# Patient Record
Sex: FEMALE | Race: WHITE | NOT HISPANIC OR LATINO | Employment: FULL TIME | URBAN - NONMETROPOLITAN AREA
[De-identification: names, ages, dates, MRNs, and addresses within clinical notes are randomized per-mention and may not be internally consistent; named-entity substitution may affect disease eponyms.]

---

## 2018-12-07 ENCOUNTER — HISTORICAL (OUTPATIENT)
Dept: ADMINISTRATIVE | Facility: HOSPITAL | Age: 33
End: 2018-12-07

## 2018-12-19 ENCOUNTER — HISTORICAL (OUTPATIENT)
Dept: ADMINISTRATIVE | Facility: HOSPITAL | Age: 33
End: 2018-12-19

## 2020-09-23 LAB
BILIRUB SERPL-MCNC: NEGATIVE MG/DL
BLOOD URINE, POC: NORMAL
CLARITY, POC UA: NORMAL
COLOR, POC UA: YELLOW
GLUCOSE UR QL STRIP: NEGATIVE
KETONES UR QL STRIP: NEGATIVE
LEUKOCYTE EST, POC UA: NEGATIVE
NITRITE, POC UA: NEGATIVE
PH, POC UA: 5.5
POC BETA-HCG (QUAL): NEGATIVE
PROTEIN, POC: NEGATIVE
SPECIFIC GRAVITY, POC UA: NORMAL
UROBILINOGEN, POC UA: NORMAL

## 2020-11-05 LAB — POC BETA-HCG (QUAL): NEGATIVE

## 2020-11-11 LAB — POC BETA-HCG (QUAL): NEGATIVE

## 2022-04-11 ENCOUNTER — HISTORICAL (OUTPATIENT)
Dept: ADMINISTRATIVE | Facility: HOSPITAL | Age: 37
End: 2022-04-11

## 2022-04-28 VITALS
BODY MASS INDEX: 38.02 KG/M2 | WEIGHT: 222.69 LBS | HEIGHT: 64 IN | SYSTOLIC BLOOD PRESSURE: 126 MMHG | DIASTOLIC BLOOD PRESSURE: 76 MMHG

## 2022-09-21 ENCOUNTER — HISTORICAL (OUTPATIENT)
Dept: ADMINISTRATIVE | Facility: HOSPITAL | Age: 37
End: 2022-09-21

## 2023-03-29 ENCOUNTER — OFFICE VISIT (OUTPATIENT)
Dept: OBSTETRICS AND GYNECOLOGY | Facility: CLINIC | Age: 38
End: 2023-03-29
Payer: COMMERCIAL

## 2023-03-29 ENCOUNTER — LAB VISIT (OUTPATIENT)
Dept: LAB | Facility: HOSPITAL | Age: 38
End: 2023-03-29
Attending: OBSTETRICS & GYNECOLOGY
Payer: COMMERCIAL

## 2023-03-29 VITALS
HEIGHT: 64 IN | SYSTOLIC BLOOD PRESSURE: 128 MMHG | WEIGHT: 205.5 LBS | BODY MASS INDEX: 35.08 KG/M2 | TEMPERATURE: 98 F | DIASTOLIC BLOOD PRESSURE: 68 MMHG

## 2023-03-29 DIAGNOSIS — L68.0 HIRSUTISM: ICD-10-CM

## 2023-03-29 DIAGNOSIS — Z01.419 WELL WOMAN EXAM: ICD-10-CM

## 2023-03-29 DIAGNOSIS — Z12.4 PAP SMEAR FOR CERVICAL CANCER SCREENING: Primary | ICD-10-CM

## 2023-03-29 DIAGNOSIS — R30.0 DYSURIA: ICD-10-CM

## 2023-03-29 LAB
BILIRUB UR QL STRIP: NEGATIVE
DEPRECATED CALCIDIOL+CALCIFEROL SERPL-MC: 20.8 NG/ML (ref 30–100)
ERYTHROCYTE [DISTWIDTH] IN BLOOD BY AUTOMATED COUNT: 12 % (ref 11–14.5)
GLUCOSE UR QL STRIP: NEGATIVE
HCT VFR BLD AUTO: 41 % (ref 36–48)
HGB BLD-MCNC: 13.8 G/DL (ref 11.8–16)
KETONES UR QL STRIP: NEGATIVE
LEUKOCYTE ESTERASE UR QL STRIP: NEGATIVE
MCH RBC QN AUTO: 28.2 PG (ref 27–34)
MCV RBC AUTO: 83.8 FL (ref 79–99)
MEAN CELL HEMOGLOBIN CONCENTRATION (OHS) G/DL: 33.7 G/DL (ref 31–37)
NRBC BLD AUTO-RTO: 0 % (ref 0–1)
PH, POC UA: 7
PLATELET # BLD AUTO: 321 X10(3)/MCL (ref 140–371)
PMV BLD AUTO: 9.9 FL (ref 9.4–12.4)
POC BLOOD, URINE: POSITIVE
POC NITRATES, URINE: NEGATIVE
PROT UR QL STRIP: NEGATIVE
RBC # BLD AUTO: 4.89 X10(6)/MCL (ref 4–5.1)
SP GR UR STRIP: 1.02 (ref 1–1.03)
TSH SERPL-ACNC: 0.84 UIU/ML (ref 0.36–3.74)
UROBILINOGEN UR STRIP-ACNC: 0.2 (ref 0.1–1.1)
VIT B12 SERPL-MCNC: 541 PG/ML (ref 211–946)
WBC # SPEC AUTO: 7.8 X10(3)/MCL (ref 4–11.5)

## 2023-03-29 PROCEDURE — 84443 ASSAY THYROID STIM HORMONE: CPT

## 2023-03-29 PROCEDURE — 3008F PR BODY MASS INDEX (BMI) DOCUMENTED: ICD-10-PCS | Mod: CPTII,,, | Performed by: OBSTETRICS & GYNECOLOGY

## 2023-03-29 PROCEDURE — 81003 URINALYSIS AUTO W/O SCOPE: CPT | Mod: QW,,, | Performed by: OBSTETRICS & GYNECOLOGY

## 2023-03-29 PROCEDURE — 99395 PREV VISIT EST AGE 18-39: CPT | Mod: 25,,, | Performed by: OBSTETRICS & GYNECOLOGY

## 2023-03-29 PROCEDURE — 85027 COMPLETE CBC AUTOMATED: CPT

## 2023-03-29 PROCEDURE — 1160F RVW MEDS BY RX/DR IN RCRD: CPT | Mod: CPTII,,, | Performed by: OBSTETRICS & GYNECOLOGY

## 2023-03-29 PROCEDURE — 3074F PR MOST RECENT SYSTOLIC BLOOD PRESSURE < 130 MM HG: ICD-10-PCS | Mod: CPTII,,, | Performed by: OBSTETRICS & GYNECOLOGY

## 2023-03-29 PROCEDURE — 82306 VITAMIN D 25 HYDROXY: CPT

## 2023-03-29 PROCEDURE — 82607 VITAMIN B-12: CPT

## 2023-03-29 PROCEDURE — 3008F BODY MASS INDEX DOCD: CPT | Mod: CPTII,,, | Performed by: OBSTETRICS & GYNECOLOGY

## 2023-03-29 PROCEDURE — 3078F DIAST BP <80 MM HG: CPT | Mod: CPTII,,, | Performed by: OBSTETRICS & GYNECOLOGY

## 2023-03-29 PROCEDURE — 99395 PR PREVENTIVE VISIT,EST,18-39: ICD-10-PCS | Mod: 25,,, | Performed by: OBSTETRICS & GYNECOLOGY

## 2023-03-29 PROCEDURE — 3074F SYST BP LT 130 MM HG: CPT | Mod: CPTII,,, | Performed by: OBSTETRICS & GYNECOLOGY

## 2023-03-29 PROCEDURE — 84402 ASSAY OF FREE TESTOSTERONE: CPT | Mod: 90

## 2023-03-29 PROCEDURE — 1159F MED LIST DOCD IN RCRD: CPT | Mod: CPTII,,, | Performed by: OBSTETRICS & GYNECOLOGY

## 2023-03-29 PROCEDURE — 1159F PR MEDICATION LIST DOCUMENTED IN MEDICAL RECORD: ICD-10-PCS | Mod: CPTII,,, | Performed by: OBSTETRICS & GYNECOLOGY

## 2023-03-29 PROCEDURE — 81003 POCT URINALYSIS, DIPSTICK, AUTOMATED, W/O SCOPE: ICD-10-PCS | Mod: QW,,, | Performed by: OBSTETRICS & GYNECOLOGY

## 2023-03-29 PROCEDURE — 36415 COLL VENOUS BLD VENIPUNCTURE: CPT

## 2023-03-29 PROCEDURE — 3078F PR MOST RECENT DIASTOLIC BLOOD PRESSURE < 80 MM HG: ICD-10-PCS | Mod: CPTII,,, | Performed by: OBSTETRICS & GYNECOLOGY

## 2023-03-29 PROCEDURE — 1160F PR REVIEW ALL MEDS BY PRESCRIBER/CLIN PHARMACIST DOCUMENTED: ICD-10-PCS | Mod: CPTII,,, | Performed by: OBSTETRICS & GYNECOLOGY

## 2023-03-29 RX ORDER — LISDEXAMFETAMINE DIMESYLATE 60 MG/1
CAPSULE ORAL
COMMUNITY
Start: 2023-01-18 | End: 2023-10-11

## 2023-03-29 NOTE — PROGRESS NOTES
Chief Complaint  Well woman. Burning with urination.    History of Present Illness:  Rachael Corrales is a 37 y.o. year old  presents for her well woman. She is doing well. Denies any health changes. Currently using nothing for birth control. Patient has monthly cycles with moderate flow lasting 5 days. Denies any irregular menstrual bleeding. Denies significant dysmenorrhea.      Reports an increase in facial hair as well has fatigue.     C/o painful urination and pressure in the bladder.         LMP: 3/10/23  Frequency: 28 days   Cycle Length: 4-5  days   Flow: normal  Intermenstrual Bleeding: No  Postcoital Bleeding: No  Dysmenorrhea: No  Sexually Active: yes   Dyspareunia: No  Contraception: none   H/o STI: HPV   Last pap: 20 ASCUS +HPV  H/o Abnormal Pap: Yes   Gardasil: none   MMG: n/a    Review of Systems:  General/Constitutional: Chills denies. Fatigue/weakness ADMITS . Fever denies . Night sweats denies . Hot flashes denies    Respiratory: Cough denies . Hemoptysis denies . SOB denies . Sputum production denies . Wheezing denies .   Cardiovascular: Chest pain denies . Dizziness denies . Palpitations denies . Swelling in hands/feet denies    Gastrointestinal: Abdominal pain denies . Blood in stool denies . Constipation denies. Diarrhea denies . Heartburn denies . Nausea denies . Vomiting denies    Genitourinary: Incontinence denies . Blood in urine denies. Frequent urination ADMITS . Painful urination denies. Urinary urgency denies.  Nocturia denies    Gynecologic: Irregular menses denies. Heavy bleeding  denies. Painful menses denies. Vaginal discharge denies . Vaginal odor denies. Vaginal itching denies   Vaginal lesion denies.  Pelvic pain denies . Decreased libido denies. Vulvar lesion denies . Prolapse of genital organs denies . Painful intercourse denies . Postcoital bleeding denies    Psychiatric: Depression denies. Anxiety denies      OB History          3    Para   3    Term   3     "        AB        Living   3         SAB        IAB        Ectopic        Multiple        Live Births   3               Past Medical History:   Diagnosis Date    Mental disorder        Current Outpatient Medications:     lisdexamfetamine (VYVANSE) 60 MG capsule, , Disp: , Rfl:     Review of patient's allergies indicates:  No Known Allergies    History reviewed. No pertinent surgical history.    Family History   Problem Relation Age of Onset    Breast cancer Maternal Grandmother      Social History     Socioeconomic History    Marital status:    Tobacco Use    Smoking status: Never    Smokeless tobacco: Never   Substance and Sexual Activity    Alcohol use: Yes    Drug use: Never    Sexual activity: Yes     Partners: Male     Birth control/protection: None       Physical Exam:  /68 (BP Location: Left arm)   Temp 97.5 °F (36.4 °C)   Ht 5' 3.78" (1.62 m)   Wt 93.2 kg (205 lb 7.5 oz)   LMP 03/10/2023   BMI 35.51 kg/m²     Chaperone: present.     General appearance: healthy, well-nourished and well-developed     Psychiatric: Orientation to time, place and person. Normal mood and affect and active, alert     Skin: Appearance: no rashes or lesions.     Neck:   Neck: supple, FROM, trachea midline. and no masses   Thyroid: no enlargement or nodules and non-tender.       Cardiovascular:   Auscultation: RRR and no murmur.   Peripheral Vascular: no varicosities, LLE edema, RLE edema, calf tenderness, and palpable cord and pedal pulses intact.     Lungs:   Respiratory effort: no intercostal retractions or accessory muscle usage.   Auscultation: no wheezing, rales/crackles, or rhonchi and clear to auscultation.     Breast:   Inspection/Palpation: no tenderness, discrete/distinct masses, skin changes, or abnormal secretions. Nipple appearance normal.     Abdomen:   Auscultation/Inspection/Palpation: no hepatomegaly, splenomegaly, masses, tenderness or CVA tenderness and soft, non-distended bowel sounds " preset.    Hernia: no palpable hernias.     Female Genitalia:   Vulva: no masses, tenderness or lesions   Bladder/Urethra: no urethral discharge or mass, normal meatus, bladder non-distended.   Vagina: no tenderness, erythema, cystocele, rectocele, abnormal vaginal discharge or vesicle(s) or ulcers   Cervix: no discharge, no cervical lacerations noted or motion tenderness and grossly normal   Uterus: normal size and shape and midline, non-tender, and no uterine prolapse.   Adnexa/Parametria: no parametrial tenderness or mass, no adnexal tenderness or ovarian mass.     Lymph Nodes:   Palpation: non tender submandibular nodes, axillary nodes, or inguinal nodes.     Rectal Exam:   Rectum: normal perianal skin.       Assessment/Plan:  1. Well woman exam  Pap  Advised patient if she notices any changes to her breasts, including a lump, mass, dimpling, discharge, rash or tenderness, to should contact us immediately   Healthy diet, exercise   Multivitamin   Seat belt   Sunscreen use   Safe sex/ STI education   Contraception evaluation: NONE   Gardasil evaluation: x0    2. Dysuria  UA, normal  Hydration  Call if symptoms do not improve    -     POCT Urinalysis, Dipstick, Automated, W/O Scope    3. Hirsutism  Educated patient on hirsutism or as excessive male-pattern hair growth in a woman, is common affecting between 5 - 10 percent of women of reproductive age.       We discussed available pharmacologic options, methods of direct hair removal (photoepilation [laser and intense pulsed light] electrolysis, and waxing/shaving), and bleaching.       Educated patient changes may not be evident after starting pharmacologic therapy until six moths due to the growth phase of a hair follicle is approximately six months; a significant reduction in hair growth may not occur before then.       If suboptimal cosmetic result after six months of JOSÉ MIGUEL monotherapy, we may suggest adding an antiandrogen.       Educated drug therapy is  unlikely to eliminate already existing hair growth, but that hair may become less coarse, grow more slowly, and/or require less frequent use of cosmetic methods (shaving, plucking, waxing).     -     TESTOSTERONE, FREE (DIALYSIS) AND TOTAL, LC/MS/MS; Future; Expected date: 03/29/2023  -     CBC Without Differential; Future; Expected date: 03/29/2023  -     Vitamin D; Future; Expected date: 03/29/2023  -     Vitamin B12; Future; Expected date: 03/29/2023  -     TSH; Future; Expected date: 03/29/2023  -     T4, Free; Future; Expected date: 03/29/2023       4. Fatigue

## 2023-03-30 ENCOUNTER — TELEPHONE (OUTPATIENT)
Dept: OBSTETRICS AND GYNECOLOGY | Facility: CLINIC | Age: 38
End: 2023-03-30
Payer: COMMERCIAL

## 2023-03-30 DIAGNOSIS — R79.89 LOW VITAMIN D LEVEL: Primary | ICD-10-CM

## 2023-03-30 LAB — FINAL PATHOLOGIC DIAGNOSIS: NORMAL

## 2023-03-30 RX ORDER — VIT C/E/ZN/COPPR/LUTEIN/ZEAXAN 250MG-90MG
1000 CAPSULE ORAL DAILY
Qty: 30 CAPSULE | Refills: 3 | Status: SHIPPED | OUTPATIENT
Start: 2023-03-30 | End: 2023-10-11

## 2023-03-30 NOTE — TELEPHONE ENCOUNTER
Notified patient of low vitamin d. Medication sent to [facility]. Scheduled for 3 months. Patient voiced understanding.

## 2023-04-03 LAB
TESTOST FREE SERPL-MCNC: 0.73 NG/DL
TESTOST SERPL-MCNC: 35 NG/DL (ref 8–60)

## 2023-04-04 LAB
AGE GDLN ACOG TESTING: NORMAL
CYTOLOGIST CVX/VAG CYTO: NORMAL
CYTOLOGY CVX/VAG DOC CYTO: NORMAL
CYTOLOGY CVX/VAG DOC THIN PREP: NORMAL
DX ICD CODE: NORMAL
HPV I/H RISK 4 DNA CVX QL PROBE+SIG AMP: NEGATIVE
LC HPV GENOTYPE REFLEX: NORMAL
Lab: NORMAL
OTHER STN SPEC: NORMAL
STAT OF ADQ CVX/VAG CYTO-IMP: NORMAL

## 2023-07-07 ENCOUNTER — HOSPITAL ENCOUNTER (EMERGENCY)
Facility: HOSPITAL | Age: 38
Discharge: HOME | End: 2023-07-07
Attending: FAMILY MEDICINE
Payer: COMMERCIAL

## 2023-07-07 ENCOUNTER — TELEPHONE (OUTPATIENT)
Dept: OBSTETRICS AND GYNECOLOGY | Facility: CLINIC | Age: 38
End: 2023-07-07
Payer: COMMERCIAL

## 2023-07-07 VITALS
WEIGHT: 205 LBS | SYSTOLIC BLOOD PRESSURE: 124 MMHG | DIASTOLIC BLOOD PRESSURE: 75 MMHG | RESPIRATION RATE: 20 BRPM | HEIGHT: 64 IN | HEART RATE: 77 BPM | TEMPERATURE: 98 F | OXYGEN SATURATION: 100 % | BODY MASS INDEX: 35 KG/M2

## 2023-07-07 DIAGNOSIS — O03.9 SPONTANEOUS ABORTION: Primary | ICD-10-CM

## 2023-07-07 DIAGNOSIS — O36.80X0 ENCOUNTER FOR ASSESSMENT FOR FETAL DEMISE: ICD-10-CM

## 2023-07-07 LAB
ABO AND RH: NORMAL
ANTIBODY SCREEN: NORMAL
APPEARANCE UR: CLEAR
B-HCG FREE SERPL-ACNC: NORMAL MIU/ML
BILIRUB UR QL STRIP.AUTO: NEGATIVE MG/DL
COLOR UR: YELLOW
GLUCOSE UR QL STRIP.AUTO: NEGATIVE MG/DL
KETONES UR QL STRIP.AUTO: NEGATIVE MG/DL
LEUKOCYTE ESTERASE UR QL STRIP.AUTO: NEGATIVE UNIT/L
NITRITE UR QL STRIP.AUTO: NEGATIVE
PH UR STRIP.AUTO: 6.5 [PH]
PROT UR QL STRIP.AUTO: NEGATIVE MG/DL
RBC UR QL AUTO: NEGATIVE UNIT/L
RH IMMUNE GLOBULIN: NORMAL
RH TYPE: NORMAL
SP GR UR STRIP.AUTO: <=1.005
SPECIMEN OUTDATE: NORMAL
UROBILINOGEN UR STRIP-ACNC: 0.2 MG/DL

## 2023-07-07 PROCEDURE — 84702 CHORIONIC GONADOTROPIN TEST: CPT | Performed by: NURSE PRACTITIONER

## 2023-07-07 PROCEDURE — 86901 BLOOD TYPING SEROLOGIC RH(D): CPT | Performed by: FAMILY MEDICINE

## 2023-07-07 PROCEDURE — 96372 THER/PROPH/DIAG INJ SC/IM: CPT | Performed by: NURSE PRACTITIONER

## 2023-07-07 PROCEDURE — 63600519 RHOGAM PHARM REV CODE 636 ALT 250 W HCPCS: Performed by: NURSE PRACTITIONER

## 2023-07-07 PROCEDURE — 36415 COLL VENOUS BLD VENIPUNCTURE: CPT | Performed by: FAMILY MEDICINE

## 2023-07-07 PROCEDURE — 25000003 PHARM REV CODE 250: Performed by: NURSE PRACTITIONER

## 2023-07-07 PROCEDURE — 85461 HEMOGLOBIN FETAL: CPT | Performed by: NURSE PRACTITIONER

## 2023-07-07 PROCEDURE — 81003 URINALYSIS AUTO W/O SCOPE: CPT | Performed by: NURSE PRACTITIONER

## 2023-07-07 PROCEDURE — 99284 EMERGENCY DEPT VISIT MOD MDM: CPT

## 2023-07-07 PROCEDURE — 36415 COLL VENOUS BLD VENIPUNCTURE: CPT | Performed by: NURSE PRACTITIONER

## 2023-07-07 PROCEDURE — 86900 BLOOD TYPING SEROLOGIC ABO: CPT | Performed by: NURSE PRACTITIONER

## 2023-07-07 RX ORDER — ACETAMINOPHEN 500 MG
1000 TABLET ORAL
Status: COMPLETED | OUTPATIENT
Start: 2023-07-07 | End: 2023-07-07

## 2023-07-07 RX ADMIN — HUMAN RHO(D) IMMUNE GLOBULIN 300 MCG: 300 INJECTION, SOLUTION INTRAMUSCULAR at 05:07

## 2023-07-07 RX ADMIN — ACETAMINOPHEN 1000 MG: 500 TABLET, FILM COATED ORAL at 04:07

## 2023-07-07 NOTE — TELEPHONE ENCOUNTER
"Called juanita millan [facility] after reviewing email on suspected no heart tone for pt who is newly pregnant. After speaking on best practice w susana alvarado, I called juanita back at [facility] to advise her that pt needed to go to ER on behalf of KB not being here and to get evaluated by ER and on call Dr. Costello states pt is " probably not going to go to the ER and I told her unless she was bleeding then she did not need to and besides its going to cost her an arm and a leg" I then called pt to verbalize safety on needs to be evaluated and have a complete diagnosis immediately. Pt verbalized understanding  "

## 2023-07-07 NOTE — ED PROVIDER NOTES
"Encounter Date: 2023       History     Chief Complaint   Patient presents with    Threatened Miscarriage     PT [facility] AND NOT BEING ABLE TO FIND FETAL HEARTBEAT. PT STATES BEING APPROX 9 WEEKS GESTATION. 1ST OB/GYN DR. OSHEA ON TUES. DENIES ANY VAGINAL BLEEDING OR ABD CRAMPING.      Went to specialized US and technologist states could not see fetal movement or heart rate.  A0  No bleeding  No cramping  No discharge    Per notes from Dr. Oshea's office:  Called juanita w [facility] after reviewing email on suspected no heart tone for pt who is newly pregnant. After speaking on best practice w susana alvarado, I called juanita back at [facility] to advise her that pt needed to go to ER on behalf of KB not being here and to get evaluated by ER and on call Dr. Costello states pt is " probably not going to go to the ER and I told her unless she was bleeding then she did not need to and besides its going to cost her an arm and a leg" I then called pt to verbalize safety on needs to be evaluated and have a complete diagnosis immediately.     The history is provided by the patient. No  was used.   Review of patient's allergies indicates:  No Known Allergies  Past Medical History:   Diagnosis Date    Mental disorder      No past surgical history on file.  Family History   Problem Relation Age of Onset    Breast cancer Maternal Grandmother      Social History     Tobacco Use    Smoking status: Never    Smokeless tobacco: Never   Substance Use Topics    Alcohol use: Yes    Drug use: Never     Review of Systems   All other systems reviewed and are negative.    Physical Exam     Initial Vitals [23 1335]   BP Pulse Resp Temp SpO2   (!) 147/79 79 18 98.1 °F (36.7 °C) 97 %      MAP       --         Physical Exam    Nursing note and vitals reviewed.  Constitutional: She appears well-developed and well-nourished.   HENT:   Head: Normocephalic and atraumatic.   Eyes: EOM are normal. " Pupils are equal, round, and reactive to light. Right eye exhibits no discharge. Left eye exhibits no discharge.   Cardiovascular:  Normal rate.           Pulmonary/Chest: No respiratory distress.   Abdominal: Abdomen is soft.   Musculoskeletal:         General: Normal range of motion.     Neurological: She is alert and oriented to person, place, and time. GCS score is 15. GCS eye subscore is 4. GCS verbal subscore is 5. GCS motor subscore is 6.   Skin: Skin is warm. Capillary refill takes less than 2 seconds.   Psychiatric: She has a normal mood and affect. Thought content normal.       ED Course   Procedures  Labs Reviewed   HCG, QUANTITATIVE    Narrative:     Beta-HCG ref range weeks after implantation (mIU/mL):   3-4wks 9-130   4-5wks    5-6wks 850-07559   6-7wks 4500-026339   7-12wks 57790-875853   12-16wks 17014-220340   16-28wks 1400-65335   20-41wks [phone]   URINALYSIS, REFLEX TO URINE CULTURE    Narrative:      URINE STABILITY IS 2 HOURS AT ROOM TEMP OR    SIX HOURS REFRIGERATED. PERFORMING TESTING ON    SPECIMENS GREATER THAN THIS AGE MAY AFFECT THE    FOLLOWING TESTS:    PH          SPECIFIC GRAVITY           BLOOD    CLARITY     BILIRUBIN               UROBILINOGEN   RH TYPING   TYPE & SCREEN   DALIA - [facility] (FETAL BLEED SCREEN)   PREPARE RH IMMUNE GLOBULIN          Imaging Results              US OB Transvaginal (Final result)  Result time 07/07/23 14:54:00      Final result by Akbar Matute III, MD (07/07/23 14:54:00)                   Impression:      1. A single intrauterine pregnancy is present with a mean sonographic gestational age of 8 weeks and 6 days.  2. There is no spontaneous fetal motility and no cardiac activity suggesting fetal demise.  Correlation with serial serum beta HCGs over 2-4 days and repeat ultrasound as felt to be clinically necessary should help clarify this if felt to be clinically necessary.      Electronically signed by: Akbar  Juju  Date:    2023  Time:    14:54               Narrative:    EXAMINATION:  STUDY: US OB TRANSVAGINAL    CLINICAL HISTORY AND TECHNIQUE:  Lorenpipo Joelle, RT on 2023  2:49 PM    CLINICAL HX: --  NO FETAL HEART TONES SEEN [facility],  PT APPROX 9 WEEKS PREGNANT    PMH:     TECHNIQUE: 2D TRANSVAGINAL OB ULTRASOUND WITH COLOR DOPPLER AND M MODE    TECHNOLOGIST: TASHIA    REPORT CALLED TO ER    COMPARISON:  None    FINDINGS:  A gestational sac with a normal appearing decidual reaction is noted high within the endometrial cavity .  A fetal pole is noted within the gestational sac with a crown-rump length of 2.21 cm (8 weeks 6 days).  No spontaneous fetal motility and no cardiac activity is appreciated suggesting fetal demise.  The ovaries and adnexa, where visualized, appear unremarkable.                                    X-Rays:   Independently Interpreted Readings:   Other Readings:  8 weeks 6 days  No heart beat  No fetal movement  Medications   rho(D) immune globulin injection 300 mcg (has no administration in time range)   acetaminophen tablet 1,000 mg (1,000 mg Oral Given 23 3988)                Attending Attestation:   Physician Attestation Statement for Resident:  As the supervising MD   Physician Attestation Statement: I have personally seen and examined this patient.   I agree with the above history.  -: Asymptomatic probable fetal demise per ultrasound today.  Note that the patient had a positive fetal heart tones at an earlier ultrasound.   As the supervising MD I agree with the above PE.     -: Ultrasound transvaginal shows an 8 week 6 day fetus with no fetal heart tones.  Ninety-seven thousand beta hCG.  Discussed with Dr. Dayne MAGAÑA on-call for Dr. Oshea, he suggests a dose of RhoGAM in the emergency room, return precautions, and follow up with Dr. Oshea at the appointment she already has on Tuesday.  Discussed with the patient and questions were answered.                  ED Course as of 23 170   1626 hCG, quantitative, pregnancy [RB]      ED Course User Index  [RB] Michael Antoine MD                 Clinical Impression:   Final diagnoses:  [Z76.89] Encounter for assessment for fetal demise  [O03.9] Spontaneous  (Primary)        ED Disposition Condition    Discharge Stable          ED Prescriptions    None       Follow-up Information       Follow up With Specialties Details Why Contact Info    Yoanna Oshea MD Obstetrics and Gynecology  Keep your appointment on Tuesday [address] [facility] [address]  [phone]               Michael Antoine MD  23 1701       LAZARA Pinto  23 1141

## 2023-07-07 NOTE — DISCHARGE INSTRUCTIONS
Pelvic rest, plenty of fluids.  Return precautions include severe pain, fever, or severe vaginal bleeding.

## 2023-07-10 NOTE — PROGRESS NOTES
Chief Complaint:  Confirmed Missed AB     History of Present Illness:  Rachael Corrales is a 37 y.o. year old  presents to confirm a missed ab. [facility] and a heartbeat was not visualized. Denies vaginal bleeding or pain.      LMP: 23  Frequency: 28 days   Cycle Length: 4-5  days   Flow: normal  Intermenstrual Bleeding: No  Postcoital Bleeding: No  Dysmenorrhea: No  Sexually Active: yes   Dyspareunia: No  Contraception: none   H/o STI: HPV   Last pap: 3/30/23 WNL   H/o Abnormal Pap: Yes   Gardasil: none   MMG: no hx      Review of Systems:  General/Constitutional: Chills denies. Fatigue/weakness denies . Fever denies . Night sweats denies . Hot flashes denies    Respiratory: Cough denies . Hemoptysis denies . SOB denies . Sputum production denies . Wheezing denies .   Cardiovascular: Chest pain denies . Dizziness denies . Palpitations denies . Swelling in hands/feet denies    Gastrointestinal: Abdominal pain denies . Blood in stool denies . Constipation denies. Diarrhea denies . Heartburn denies . Nausea denies. Vomiting denies    Genitourinary: Incontinence denies . Blood in urine denies. Frequent urination denies . Painful urination denies.  Urinary urgency denies.  Nocturia denies    Gynecologic: Irregular menses denies. Heavy bleeding  denies. Painful menses denies. Vaginal discharge denies . Vaginal odor denies. Vaginal itching denies Vaginal lesion denies.  Pelvic pain denies . Decreased libido denies. Vulvar lesion denies . Prolapse of genital organs denies . Painful intercourse denies . Postcoital bleeding denies    Psychiatric: Depression denies. Anxiety denies     OB History    Para Term  AB Living   4 3 3 0 0 3   SAB IAB Ectopic Multiple Live Births   0 0 0 0 3      # 1 - Date: 06, Sex: Female, Weight: 3.6 kg (7 lb 15 oz), GA: None, Delivery: Vaginal, Spontaneous, Apgar1: None, Apgar5: None, Living: Living, Birth Comments: None    # 2 -  Date: 05/18/09, Sex: Female, Weight: 2.608 kg (5 lb 12 oz), GA: 37w0d, Delivery: Vaginal, Spontaneous, Apgar1: None, Apgar5: None, Living: Living, Birth Comments: None    # 3 - Date: 04/19/19, Sex: Male, Weight: 3.345 kg (7 lb 6 oz), GA: None, Delivery: Vaginal, Spontaneous, Apgar1: None, Apgar5: None, Living: Living, Birth Comments: None    # 4 - Date: None, Sex: None, Weight: None, GA: None, Delivery: None, Apgar1: None, Apgar5: None, Living: None, Birth Comments: None      Past Medical History:   Diagnosis Date    Mental disorder        Current Outpatient Medications:     cholecalciferol, vitamin D3, (VITAMIN D3) 25 mcg (1,000 unit) capsule, Take 1 capsule (1,000 Units total) by mouth once daily., Disp: 30 capsule, Rfl: 3    lisdexamfetamine (VYVANSE) 60 MG capsule, , Disp: , Rfl:     History reviewed. No pertinent surgical history.    Family History   Problem Relation Age of Onset    Breast cancer Maternal Grandmother      Social History     Socioeconomic History    Marital status:    Tobacco Use    Smoking status: Never    Smokeless tobacco: Never   Substance and Sexual Activity    Alcohol use: Yes    Drug use: Never    Sexual activity: Yes     Partners: Male     Birth control/protection: None       Physical Exam:  LMP 04/28/2023 (Exact Date)     Chaperone present.       Constitutional: General appearance: healthy, well-nourished and well-developed  Psychiatric:  Orientation to time, place and person. Normal mood and affect and active, alert   Abdomen: Auscultation/Inspection/Palpation: No tenderness or masses. Soft, nondistended      Female Genitalia:      Vulva: no masses, atrophy or lesions      Bladder/Urethra: no urethral discharge or mass, normal meatus, bladder non-distended.      Vagina: no tenderness, erythema, cystocele, rectocele, abnormal vaginal discharge, or vesicle(s) or ulcers                   Cervix: no discharge or cervical motion tenderness and grossly normal      Uterus: normal size  and shape and midline, non-tender, and no uterine prolapse.      Adnexa/Parametria: no parametrial tenderness or mass, no adnexal tenderness or ovarian mass.       Assessment/Plan:  1. Missed ab  Educated on Missed AB     Reviewed all labs/radiology results- see below   Educated on conservative management, medical management (Cytotec), surgical management (D&C)       Patient desires cytotec    Discussed bleeding/pain level to be expected, advised NSAIDs heating pad       Rx: Cytotec 600mcg SL PO x 3 doses     Rx: Norco 5mg  #7/Zofran/Ibuprofen given       Pain/fever ER precautions given     MBT:  A neg/neg, sp Rhogam       RTS today     CRL 13.2mm 7w 4d [place]lock      RTS 7/7//2023     1. A single intrauterine pregnancy is present with a mean sonographic gestational age of 8 weeks and 6 days.  2. There is no spontaneous fetal motility and no cardiac activity suggesting fetal demise.  Correlation with serial serum beta HCGs over 2-4 days and repeat ultrasound as felt to be clinically necessary should help clarify this if felt to be clinically necessary.       2. Rh negative, antepartum  S/p Rhogam 7/7/23

## 2023-07-11 ENCOUNTER — INITIAL PRENATAL (OUTPATIENT)
Dept: OBSTETRICS AND GYNECOLOGY | Facility: CLINIC | Age: 38
End: 2023-07-11
Payer: COMMERCIAL

## 2023-07-11 ENCOUNTER — PATIENT MESSAGE (OUTPATIENT)
Dept: OBSTETRICS AND GYNECOLOGY | Facility: CLINIC | Age: 38
End: 2023-07-11

## 2023-07-11 DIAGNOSIS — O02.1 MISSED AB: Primary | ICD-10-CM

## 2023-07-11 DIAGNOSIS — Z67.91 RH NEGATIVE, ANTEPARTUM: ICD-10-CM

## 2023-07-11 DIAGNOSIS — O26.899 RH NEGATIVE, ANTEPARTUM: ICD-10-CM

## 2023-07-11 PROBLEM — O09.529 AMA (ADVANCED MATERNAL AGE) MULTIGRAVIDA 35+: Status: ACTIVE | Noted: 2023-07-11

## 2023-07-11 PROCEDURE — 76817 US OB/GYN EXTENDED PROCEDURE (VIEWPOINT): ICD-10-PCS | Mod: 26,,, | Performed by: OBSTETRICS & GYNECOLOGY

## 2023-07-11 PROCEDURE — 99213 PR OFFICE/OUTPT VISIT, EST, LEVL III, 20-29 MIN: ICD-10-PCS | Mod: 25,,, | Performed by: OBSTETRICS & GYNECOLOGY

## 2023-07-11 PROCEDURE — 76817 TRANSVAGINAL US OBSTETRIC: CPT | Mod: 26,,, | Performed by: OBSTETRICS & GYNECOLOGY

## 2023-07-11 PROCEDURE — 99213 OFFICE O/P EST LOW 20 MIN: CPT | Mod: 25,,, | Performed by: OBSTETRICS & GYNECOLOGY

## 2023-07-12 VITALS
HEIGHT: 64 IN | SYSTOLIC BLOOD PRESSURE: 122 MMHG | TEMPERATURE: 98 F | BODY MASS INDEX: 35 KG/M2 | WEIGHT: 205 LBS | DIASTOLIC BLOOD PRESSURE: 72 MMHG

## 2023-07-12 NOTE — PROGRESS NOTES
Chief Complaint:  Pre-Op for Suction D&C    History of Present Illness:  Rachael Corrales is a 37 y.o.  with a missed ab  presents to pre-admit for her upcoming suction d&c. No longer desires cytotec for medical mngt.       Review of systems:  General/Constitutional: Chills denies. Fatigue/weakness denies. Fever denies . Night sweats denies . Hot flashes denies  Gastrointestinal: Abdominal pain denies. Blood in stool denies. Constipation denies. Diarrhea denies. Heartburn denies. Nausea denies. Vomiting denies   Genitourinary: Incontinence denies. Blood in urine denies. Frequent urination denies.  Urgency denies. Painful urination denies. Nocturia denies   Gynecologic: Irregular menses denies.  Heavy bleeding  denies.  Painful menses denies.  Vaginal discharge denies. Vaginal odor denies. Vaginal itching/Irritation denies. Vaginal lesion denies.  Pelvic pain denies. Decreased libido denies. Vulvar lesion denies. Prolapse of genital organs denies. Painful intercourse denies. Postcoital bleeding denies   Psychiatric: Mood lability denies.  Depressed mood denies. Suicidal thoughts denies. Anxiety denies.  Overwhelmed denies.  Appetite normal. Energy level normal     OB History    Para Term  AB Living   4 3 3 0 0 3   SAB IAB Ectopic Multiple Live Births   0 0 0 0 3      # 1 - Date: 06, Sex: Female, Weight: 3.6 kg (7 lb 15 oz), GA: None, Delivery: Vaginal, Spontaneous, Apgar1: None, Apgar5: None, Living: Living, Birth Comments: None    # 2 - Date: 09, Sex: Female, Weight: 2.608 kg (5 lb 12 oz), GA: 37w0d, Delivery: Vaginal, Spontaneous, Apgar1: None, Apgar5: None, Living: Living, Birth Comments: None    # 3 - Date: 19, Sex: Male, Weight: 3.345 kg (7 lb 6 oz), GA: None, Delivery: Vaginal, Spontaneous, Apgar1: None, Apgar5: None, Living: Living, Birth Comments: None    # 4 - Date: None, Sex: None, Weight: None, GA: None, Delivery: None, Apgar1: None, Apgar5: None, Living: None, Birth  "Comments: None      Past Medical History:   Diagnosis Date    Mental disorder        Current Outpatient Medications:     cholecalciferol, vitamin D3, (VITAMIN D3) 25 mcg (1,000 unit) capsule, Take 1 capsule (1,000 Units total) by mouth once daily., Disp: 30 capsule, Rfl: 3    HYDROcodone-acetaminophen (NORCO) 5-325 mg per tablet, Take 1 tablet by mouth every 6 (six) hours as needed., Disp: , Rfl:     ibuprofen 20 mg/mL oral liquid, SMARTSI Milliliter(s) By Mouth Every 6 Hours PRN, Disp: , Rfl:     lisdexamfetamine (VYVANSE) 60 MG capsule, , Disp: , Rfl:     miSOPROStoL (CYTOTEC) 200 MCG Tab, SMARTSIG:3 Tablet(s) Sublingual Every 3 Hours, Disp: , Rfl:     ondansetron (ZOFRAN-ODT) 4 MG TbDL, 4 mg every 4 (four) hours as needed., Disp: , Rfl:     Review of patient's allergies indicates:  No Known Allergies    History reviewed. No pertinent surgical history.    Family History   Problem Relation Age of Onset    Breast cancer Maternal Grandmother     Colon cancer Neg Hx     Ovarian cancer Neg Hx     Uterine cancer Neg Hx     Cervical cancer Neg Hx      Social History     Socioeconomic History    Marital status:    Tobacco Use    Smoking status: Never     Passive exposure: Never    Smokeless tobacco: Never   Substance and Sexual Activity    Alcohol use: Yes    Drug use: Never    Sexual activity: Not Currently     Partners: Male     Birth control/protection: None         Physical Exam:  /86 (BP Location: Right arm, Patient Position: Sitting, BP Method: Large (Manual))   Temp 97.7 °F (36.5 °C) (Temporal)   Ht 5' 4" (1.626 m)   Wt 98.2 kg (216 lb 7.9 oz)   LMP 2023 (Exact Date)   BMI 37.16 kg/m²     Constitutional: General appearance: healthy, well-nourished and well-developed   Psychiatric: Orientation to time, place and person. Normal mood and affect and active, alert   Abdomen: Auscultation/Inspection/Palpation: No tenderness or masses. Soft, nondistended       Assessment/Plan:  1. Missed " ab  Explained management in detail both in medical and surgical management. Explained expectations, treatment. goals, failures, complications. Reviewed premeds and postop care. Precautions for fever, bleeding excessively, and severe pain- pt advised to call immediately or present to ER. Patients questions answered.     Discussed risks including but not limited to the following: pain, bleeding, infection, uterine perforation with subsequent damage to bowel, bladder or other abdominal or pelvic organs, need for open abdominal incision to repair injuries, hysterectomy.  Patient acknowledges risks and desires to proceed with surgery.  All questions were entertained and answered.       Pt declines surgery tomorrow. Desires Monday date

## 2023-07-13 ENCOUNTER — OFFICE VISIT (OUTPATIENT)
Dept: OBSTETRICS AND GYNECOLOGY | Facility: CLINIC | Age: 38
End: 2023-07-13
Payer: COMMERCIAL

## 2023-07-13 ENCOUNTER — HOSPITAL ENCOUNTER (OUTPATIENT)
Dept: PREADMISSION TESTING | Facility: HOSPITAL | Age: 38
Discharge: HOME | End: 2023-07-13
Payer: COMMERCIAL

## 2023-07-13 VITALS
SYSTOLIC BLOOD PRESSURE: 120 MMHG | WEIGHT: 216.5 LBS | BODY MASS INDEX: 36.96 KG/M2 | DIASTOLIC BLOOD PRESSURE: 86 MMHG | TEMPERATURE: 98 F | HEIGHT: 64 IN

## 2023-07-13 VITALS — WEIGHT: 216.81 LBS | BODY MASS INDEX: 37.01 KG/M2 | HEIGHT: 64 IN

## 2023-07-13 DIAGNOSIS — O02.1 MISSED AB: Primary | ICD-10-CM

## 2023-07-13 LAB
ANION GAP SERPL CALC-SCNC: 9 MEQ/L (ref 2–13)
APPEARANCE UR: CLEAR
BACTERIA #/AREA URNS AUTO: ABNORMAL /HPF
BASOPHILS # BLD AUTO: 0.04 X10(3)/MCL (ref 0.01–0.08)
BASOPHILS NFR BLD AUTO: 0.4 % (ref 0.1–1.2)
BILIRUB UR QL STRIP.AUTO: NEGATIVE
BUN SERPL-MCNC: 15 MG/DL (ref 7–20)
CALCIUM SERPL-MCNC: 9.4 MG/DL (ref 8.4–10.2)
CHLORIDE SERPL-SCNC: 104 MMOL/L (ref 98–110)
CO2 SERPL-SCNC: 23 MMOL/L (ref 21–32)
COLOR UR: YELLOW
CREAT SERPL-MCNC: 0.45 MG/DL (ref 0.66–1.25)
CREAT/UREA NIT SERPL: 33 (ref 12–20)
EOSINOPHIL # BLD AUTO: 0.09 X10(3)/MCL (ref 0.04–0.36)
EOSINOPHIL NFR BLD AUTO: 0.8 % (ref 0.7–7)
ERYTHROCYTE [DISTWIDTH] IN BLOOD BY AUTOMATED COUNT: 12.8 % (ref 11–14.5)
GFR SERPLBLD CREATININE-BSD FMLA CKD-EPI: >90 MLS/MIN/1.73/M2
GLUCOSE SERPL-MCNC: 94 MG/DL (ref 70–115)
GLUCOSE UR QL STRIP.AUTO: NEGATIVE
HCT VFR BLD AUTO: 37.8 % (ref 36–48)
HGB BLD-MCNC: 12.9 G/DL (ref 11.8–16)
IMM GRANULOCYTES # BLD AUTO: 0.03 X10(3)/MCL (ref 0–0.03)
IMM GRANULOCYTES NFR BLD AUTO: 0.3 % (ref 0–0.5)
KETONES UR QL STRIP.AUTO: NEGATIVE
LEUKOCYTE ESTERASE UR QL STRIP.AUTO: ABNORMAL
LYMPHOCYTES # BLD AUTO: 2.75 X10(3)/MCL (ref 1.16–3.74)
LYMPHOCYTES NFR BLD AUTO: 25.5 % (ref 20–55)
MCH RBC QN AUTO: 28.4 PG (ref 27–34)
MCHC RBC AUTO-ENTMCNC: 34.1 G/DL (ref 31–37)
MCV RBC AUTO: 83.3 FL (ref 79–99)
MONOCYTES # BLD AUTO: 0.64 X10(3)/MCL (ref 0.24–0.36)
MONOCYTES NFR BLD AUTO: 5.9 % (ref 4.7–12.5)
NEUTROPHILS # BLD AUTO: 7.23 X10(3)/MCL (ref 1.56–6.13)
NEUTROPHILS NFR BLD AUTO: 67.1 % (ref 37–73)
NITRITE UR QL STRIP.AUTO: NEGATIVE
NRBC BLD AUTO-RTO: 0 %
PH UR STRIP.AUTO: 6 [PH]
PLATELET # BLD AUTO: 299 X10(3)/MCL (ref 140–371)
PMV BLD AUTO: 10.2 FL (ref 9.4–12.4)
POTASSIUM SERPL-SCNC: 4.4 MMOL/L (ref 3.5–5.1)
PROT UR QL STRIP.AUTO: NEGATIVE
RBC # BLD AUTO: 4.54 X10(6)/MCL (ref 4–5.1)
RBC #/AREA URNS AUTO: ABNORMAL /HPF
RBC UR QL AUTO: ABNORMAL
SODIUM SERPL-SCNC: 136 MMOL/L (ref 135–145)
SP GR UR STRIP.AUTO: >=1.03
SQUAMOUS #/AREA URNS AUTO: ABNORMAL /HPF
UROBILINOGEN UR STRIP-ACNC: 0.2
WBC # SPEC AUTO: 10.78 X10(3)/MCL (ref 4–11.5)
WBC #/AREA URNS AUTO: ABNORMAL /HPF

## 2023-07-13 PROCEDURE — 99214 PR OFFICE/OUTPT VISIT, EST, LEVL IV, 30-39 MIN: ICD-10-PCS | Mod: ,,, | Performed by: OBSTETRICS & GYNECOLOGY

## 2023-07-13 PROCEDURE — 3074F PR MOST RECENT SYSTOLIC BLOOD PRESSURE < 130 MM HG: ICD-10-PCS | Mod: CPTII,,, | Performed by: OBSTETRICS & GYNECOLOGY

## 2023-07-13 PROCEDURE — 81001 URINALYSIS AUTO W/SCOPE: CPT | Performed by: OBSTETRICS & GYNECOLOGY

## 2023-07-13 PROCEDURE — 3074F SYST BP LT 130 MM HG: CPT | Mod: CPTII,,, | Performed by: OBSTETRICS & GYNECOLOGY

## 2023-07-13 PROCEDURE — 99214 OFFICE O/P EST MOD 30 MIN: CPT | Mod: ,,, | Performed by: OBSTETRICS & GYNECOLOGY

## 2023-07-13 PROCEDURE — 3079F DIAST BP 80-89 MM HG: CPT | Mod: CPTII,,, | Performed by: OBSTETRICS & GYNECOLOGY

## 2023-07-13 PROCEDURE — 85025 COMPLETE CBC W/AUTO DIFF WBC: CPT | Performed by: OBSTETRICS & GYNECOLOGY

## 2023-07-13 PROCEDURE — 3008F PR BODY MASS INDEX (BMI) DOCUMENTED: ICD-10-PCS | Mod: CPTII,,, | Performed by: OBSTETRICS & GYNECOLOGY

## 2023-07-13 PROCEDURE — 80048 BASIC METABOLIC PNL TOTAL CA: CPT | Performed by: OBSTETRICS & GYNECOLOGY

## 2023-07-13 PROCEDURE — 3008F BODY MASS INDEX DOCD: CPT | Mod: CPTII,,, | Performed by: OBSTETRICS & GYNECOLOGY

## 2023-07-13 PROCEDURE — 3079F PR MOST RECENT DIASTOLIC BLOOD PRESSURE 80-89 MM HG: ICD-10-PCS | Mod: CPTII,,, | Performed by: OBSTETRICS & GYNECOLOGY

## 2023-07-13 RX ORDER — SODIUM CHLORIDE 9 MG/ML
INJECTION, SOLUTION INTRAVENOUS CONTINUOUS
Status: CANCELLED | OUTPATIENT
Start: 2023-07-13

## 2023-07-13 RX ORDER — HYDROCODONE BITARTRATE AND ACETAMINOPHEN 5; 325 MG/1; MG/1
1 TABLET ORAL EVERY 6 HOURS PRN
COMMUNITY
Start: 2023-07-11 | End: 2023-10-11

## 2023-07-13 RX ORDER — FAMOTIDINE 20 MG/1
20 TABLET, FILM COATED ORAL
Status: CANCELLED | OUTPATIENT
Start: 2023-07-13

## 2023-07-13 RX ORDER — ONDANSETRON 4 MG/1
4 TABLET, ORALLY DISINTEGRATING ORAL EVERY 4 HOURS PRN
COMMUNITY
Start: 2023-07-11 | End: 2023-11-14

## 2023-07-13 RX ORDER — MUPIROCIN 20 MG/G
OINTMENT TOPICAL
Status: CANCELLED | OUTPATIENT
Start: 2023-07-13

## 2023-07-13 RX ORDER — MISOPROSTOL 200 UG/1
TABLET ORAL
COMMUNITY
Start: 2023-07-11 | End: 2023-09-25

## 2023-07-13 RX ORDER — TRIPROLIDINE/PSEUDOEPHEDRINE 2.5MG-60MG
TABLET ORAL
COMMUNITY
Start: 2023-07-11 | End: 2023-09-25

## 2023-07-13 NOTE — DISCHARGE INSTRUCTIONS

## 2023-07-14 ENCOUNTER — ANESTHESIA EVENT (OUTPATIENT)
Dept: SURGERY | Facility: HOSPITAL | Age: 38
End: 2023-07-14
Payer: COMMERCIAL

## 2023-07-14 NOTE — ANESTHESIA PREPROCEDURE EVALUATION
07/14/2023  Rachael Corrales is a 37 y.o., female.      Pre-op Assessment    I have reviewed the Patient Summary Reports.     I have reviewed the Nursing Notes. I have reviewed the NPO Status.   I have reviewed the Medications.     Review of Systems  Anesthesia Hx:  No problems with previous Anesthesia  Denies Family Hx of Anesthesia complications.   Denies Personal Hx of Anesthesia complications.   Hematology/Oncology:  Hematology Normal   Oncology Normal     EENT/Dental:EENT/Dental Normal   Cardiovascular:  Cardiovascular Normal Exercise tolerance: good     Pulmonary:  Pulmonary Normal    Renal/:  Renal/ Normal     Hepatic/GI:  Hepatic/GI Normal    Musculoskeletal:  Musculoskeletal Normal    Neurological:  Neurology Normal    Endocrine:  Endocrine Normal    Dermatological:  Skin Normal    Psych:   Psychiatric History anxiety depression          Physical Exam  General: Well nourished, Cooperative, Alert and Oriented    Airway:  Mallampati: II / II  Mouth Opening: Normal  TM Distance: Normal  Tongue: Normal  Neck ROM: Normal ROM    Dental:  Intact        Anesthesia Plan  Type of Anesthesia, risks & benefits discussed:    Anesthesia Type: Gen ETT  Intra-op Monitoring Plan: Standard ASA Monitors  Post Op Pain Control Plan: multimodal analgesia  Induction:  IV  Airway Plan: Direct  Informed Consent: Informed consent signed with the Patient and all parties understand the risks and agree with anesthesia plan.  All questions answered. Patient consented to blood products? Yes  ASA Score: 2  Day of Surgery Review of History & Physical: H&P Update referred to the surgeon/provider.I have interviewed and examined the patient. I have reviewed the patient's H&P dated: There are no significant changes.     Ready For Surgery From Anesthesia Perspective.     .

## 2023-07-17 ENCOUNTER — HOSPITAL ENCOUNTER (OUTPATIENT)
Facility: HOSPITAL | Age: 38
Discharge: HOME | End: 2023-07-17
Attending: OBSTETRICS & GYNECOLOGY | Admitting: OBSTETRICS & GYNECOLOGY
Payer: COMMERCIAL

## 2023-07-17 ENCOUNTER — ANESTHESIA (OUTPATIENT)
Dept: SURGERY | Facility: HOSPITAL | Age: 38
End: 2023-07-17
Payer: COMMERCIAL

## 2023-07-17 VITALS
OXYGEN SATURATION: 97 % | TEMPERATURE: 97 F | RESPIRATION RATE: 18 BRPM | SYSTOLIC BLOOD PRESSURE: 109 MMHG | DIASTOLIC BLOOD PRESSURE: 67 MMHG | HEART RATE: 68 BPM

## 2023-07-17 DIAGNOSIS — O02.1 MISSED AB: ICD-10-CM

## 2023-07-17 LAB
ABO AND RH: ABNORMAL
ANTIBODY IDENTIFICATION: NORMAL
ANTIBODY SCREEN: ABNORMAL
SPECIMEN OUTDATE: ABNORMAL

## 2023-07-17 PROCEDURE — D9220A PRA ANESTHESIA: ICD-10-PCS | Mod: QZ,,, | Performed by: NURSE ANESTHETIST, CERTIFIED REGISTERED

## 2023-07-17 PROCEDURE — 36000705 HC OR TIME LEV I EA ADD 15 MIN: Performed by: OBSTETRICS & GYNECOLOGY

## 2023-07-17 PROCEDURE — 25000003 PHARM REV CODE 250: Performed by: OBSTETRICS & GYNECOLOGY

## 2023-07-17 PROCEDURE — 59820 CARE OF MISCARRIAGE: CPT | Mod: ,,, | Performed by: OBSTETRICS & GYNECOLOGY

## 2023-07-17 PROCEDURE — 71000015 HC POSTOP RECOV 1ST HR: Performed by: OBSTETRICS & GYNECOLOGY

## 2023-07-17 PROCEDURE — 36415 COLL VENOUS BLD VENIPUNCTURE: CPT | Performed by: OBSTETRICS & GYNECOLOGY

## 2023-07-17 PROCEDURE — 63600175 PHARM REV CODE 636 W HCPCS: Performed by: NURSE ANESTHETIST, CERTIFIED REGISTERED

## 2023-07-17 PROCEDURE — 36000704 HC OR TIME LEV I 1ST 15 MIN: Performed by: OBSTETRICS & GYNECOLOGY

## 2023-07-17 PROCEDURE — D9220A PRA ANESTHESIA: Mod: QZ,,, | Performed by: NURSE ANESTHETIST, CERTIFIED REGISTERED

## 2023-07-17 PROCEDURE — 37000009 HC ANESTHESIA EA ADD 15 MINS: Performed by: OBSTETRICS & GYNECOLOGY

## 2023-07-17 PROCEDURE — 37000008 HC ANESTHESIA 1ST 15 MINUTES: Performed by: OBSTETRICS & GYNECOLOGY

## 2023-07-17 PROCEDURE — 63600175 PHARM REV CODE 636 W HCPCS: Performed by: OBSTETRICS & GYNECOLOGY

## 2023-07-17 PROCEDURE — 71000016 HC POSTOP RECOV ADDL HR: Performed by: OBSTETRICS & GYNECOLOGY

## 2023-07-17 PROCEDURE — 59820 PR SURG RX MISSED ABORTN,1ST TRI: ICD-10-PCS | Mod: ,,, | Performed by: OBSTETRICS & GYNECOLOGY

## 2023-07-17 PROCEDURE — 86900 BLOOD TYPING SEROLOGIC ABO: CPT | Performed by: OBSTETRICS & GYNECOLOGY

## 2023-07-17 PROCEDURE — 86870 RBC ANTIBODY IDENTIFICATION: CPT | Performed by: OBSTETRICS & GYNECOLOGY

## 2023-07-17 PROCEDURE — 71000033 HC RECOVERY, INTIAL HOUR: Performed by: OBSTETRICS & GYNECOLOGY

## 2023-07-17 PROCEDURE — 25000003 PHARM REV CODE 250: Performed by: NURSE ANESTHETIST, CERTIFIED REGISTERED

## 2023-07-17 RX ORDER — LIDOCAINE HYDROCHLORIDE 20 MG/ML
INJECTION INTRAVENOUS
Status: DISCONTINUED | OUTPATIENT
Start: 2023-07-17 | End: 2023-07-17

## 2023-07-17 RX ORDER — FAMOTIDINE 20 MG/1
20 TABLET, FILM COATED ORAL
Status: COMPLETED | OUTPATIENT
Start: 2023-07-17 | End: 2023-07-17

## 2023-07-17 RX ORDER — MIDAZOLAM HYDROCHLORIDE 1 MG/ML
2 INJECTION INTRAMUSCULAR; INTRAVENOUS
Status: COMPLETED | OUTPATIENT
Start: 2023-07-17 | End: 2023-07-17

## 2023-07-17 RX ORDER — ONDANSETRON 4 MG/1
8 TABLET, ORALLY DISINTEGRATING ORAL EVERY 8 HOURS PRN
Status: DISCONTINUED | OUTPATIENT
Start: 2023-07-17 | End: 2023-07-17 | Stop reason: HOSPADM

## 2023-07-17 RX ORDER — NEOSTIGMINE METHYLSULFATE 1 MG/ML
INJECTION, SOLUTION INTRAVENOUS
Status: DISCONTINUED | OUTPATIENT
Start: 2023-07-17 | End: 2023-07-17

## 2023-07-17 RX ORDER — DIPHENHYDRAMINE HYDROCHLORIDE 50 MG/ML
25 INJECTION INTRAMUSCULAR; INTRAVENOUS EVERY 4 HOURS PRN
Status: DISCONTINUED | OUTPATIENT
Start: 2023-07-17 | End: 2023-07-17 | Stop reason: HOSPADM

## 2023-07-17 RX ORDER — PROCHLORPERAZINE EDISYLATE 5 MG/ML
5 INJECTION INTRAMUSCULAR; INTRAVENOUS EVERY 6 HOURS PRN
Status: DISCONTINUED | OUTPATIENT
Start: 2023-07-17 | End: 2023-07-17 | Stop reason: HOSPADM

## 2023-07-17 RX ORDER — PROPOFOL 10 MG/ML
VIAL (ML) INTRAVENOUS
Status: DISCONTINUED | OUTPATIENT
Start: 2023-07-17 | End: 2023-07-17

## 2023-07-17 RX ORDER — ONDANSETRON 2 MG/ML
INJECTION INTRAMUSCULAR; INTRAVENOUS
Status: DISCONTINUED | OUTPATIENT
Start: 2023-07-17 | End: 2023-07-17

## 2023-07-17 RX ORDER — MUPIROCIN 20 MG/G
OINTMENT TOPICAL
Status: DISCONTINUED | OUTPATIENT
Start: 2023-07-17 | End: 2023-07-17

## 2023-07-17 RX ORDER — FENTANYL CITRATE 50 UG/ML
INJECTION, SOLUTION INTRAMUSCULAR; INTRAVENOUS
Status: DISCONTINUED | OUTPATIENT
Start: 2023-07-17 | End: 2023-07-17

## 2023-07-17 RX ORDER — DEXAMETHASONE SODIUM PHOSPHATE 4 MG/ML
INJECTION, SOLUTION INTRA-ARTICULAR; INTRALESIONAL; INTRAMUSCULAR; INTRAVENOUS; SOFT TISSUE
Status: DISCONTINUED | OUTPATIENT
Start: 2023-07-17 | End: 2023-07-17

## 2023-07-17 RX ORDER — FAMOTIDINE 20 MG/1
20 TABLET, FILM COATED ORAL
Status: DISCONTINUED | OUTPATIENT
Start: 2023-07-17 | End: 2023-07-17

## 2023-07-17 RX ORDER — DIPHENHYDRAMINE HCL 25 MG
25 CAPSULE ORAL EVERY 4 HOURS PRN
Status: DISCONTINUED | OUTPATIENT
Start: 2023-07-17 | End: 2023-07-17 | Stop reason: HOSPADM

## 2023-07-17 RX ORDER — HYDROCODONE BITARTRATE AND ACETAMINOPHEN 5; 325 MG/1; MG/1
1 TABLET ORAL EVERY 4 HOURS PRN
Status: DISCONTINUED | OUTPATIENT
Start: 2023-07-17 | End: 2023-07-17 | Stop reason: HOSPADM

## 2023-07-17 RX ORDER — IBUPROFEN 600 MG/1
600 TABLET ORAL EVERY 6 HOURS PRN
Status: DISCONTINUED | OUTPATIENT
Start: 2023-07-17 | End: 2023-07-17 | Stop reason: HOSPADM

## 2023-07-17 RX ORDER — KETOROLAC TROMETHAMINE 30 MG/ML
INJECTION, SOLUTION INTRAMUSCULAR; INTRAVENOUS
Status: DISCONTINUED | OUTPATIENT
Start: 2023-07-17 | End: 2023-07-17

## 2023-07-17 RX ORDER — SODIUM CHLORIDE 9 MG/ML
INJECTION, SOLUTION INTRAVENOUS CONTINUOUS
Status: DISCONTINUED | OUTPATIENT
Start: 2023-07-17 | End: 2023-07-17

## 2023-07-17 RX ORDER — OXYCODONE AND ACETAMINOPHEN 10; 325 MG/1; MG/1
1 TABLET ORAL EVERY 4 HOURS PRN
Status: DISCONTINUED | OUTPATIENT
Start: 2023-07-17 | End: 2023-07-17 | Stop reason: HOSPADM

## 2023-07-17 RX ORDER — VECURONIUM BROMIDE FOR INJECTION 1 MG/ML
INJECTION, POWDER, LYOPHILIZED, FOR SOLUTION INTRAVENOUS
Status: DISCONTINUED | OUTPATIENT
Start: 2023-07-17 | End: 2023-07-17

## 2023-07-17 RX ORDER — HYDROMORPHONE HYDROCHLORIDE 1 MG/ML
1 INJECTION, SOLUTION INTRAMUSCULAR; INTRAVENOUS; SUBCUTANEOUS EVERY 6 HOURS PRN
Status: DISCONTINUED | OUTPATIENT
Start: 2023-07-17 | End: 2023-07-17 | Stop reason: HOSPADM

## 2023-07-17 RX ADMIN — FENTANYL CITRATE 100 MCG: 50 INJECTION, SOLUTION INTRAMUSCULAR; INTRAVENOUS at 07:07

## 2023-07-17 RX ADMIN — DEXAMETHASONE SODIUM PHOSPHATE 8 MG: 4 INJECTION, SOLUTION INTRA-ARTICULAR; INTRALESIONAL; INTRAMUSCULAR; INTRAVENOUS; SOFT TISSUE at 07:07

## 2023-07-17 RX ADMIN — LIDOCAINE HYDROCHLORIDE 75 MG: 20 INJECTION, SOLUTION INTRAVENOUS at 07:07

## 2023-07-17 RX ADMIN — PROPOFOL 150 MG: 10 INJECTION, EMULSION INTRAVENOUS at 07:07

## 2023-07-17 RX ADMIN — KETOROLAC TROMETHAMINE 30 MG: 30 INJECTION, SOLUTION INTRAMUSCULAR; INTRAVENOUS at 07:07

## 2023-07-17 RX ADMIN — GLYCOPYRROLATE 0.4 MG: 0.2 INJECTION, SOLUTION INTRAMUSCULAR; INTRAVITREAL at 07:07

## 2023-07-17 RX ADMIN — SODIUM CHLORIDE: 9 INJECTION, SOLUTION INTRAVENOUS at 06:07

## 2023-07-17 RX ADMIN — FAMOTIDINE 20 MG: 20 TABLET, FILM COATED ORAL at 06:07

## 2023-07-17 RX ADMIN — MIDAZOLAM HYDROCHLORIDE 2 MG: 1 INJECTION, SOLUTION INTRAMUSCULAR; INTRAVENOUS at 07:07

## 2023-07-17 RX ADMIN — ONDANSETRON 4 MG: 2 INJECTION INTRAMUSCULAR; INTRAVENOUS at 07:07

## 2023-07-17 RX ADMIN — NEOSTIGMINE METHYLSULFATE 3 MG: 0.5 INJECTION INTRAVENOUS at 07:07

## 2023-07-17 RX ADMIN — VECURONIUM BROMIDE 5 MG: 10 INJECTION, POWDER, FOR SOLUTION INTRAVENOUS at 07:07

## 2023-07-17 RX ADMIN — FENTANYL CITRATE 50 MCG: 50 INJECTION, SOLUTION INTRAMUSCULAR; INTRAVENOUS at 07:07

## 2023-07-17 RX ADMIN — GLYCOPYRROLATE 0.2 MG: 0.2 INJECTION, SOLUTION INTRAMUSCULAR; INTRAVITREAL at 06:07

## 2023-07-17 NOTE — PLAN OF CARE
Returned to room per stretcher in good condition. Awake and alert. No complaints. Pad between legs. Iced water given and tolerating well. No c/o cramping.

## 2023-07-17 NOTE — DISCHARGE INSTRUCTIONS
Follow-up with Dr Oshea as scheduled.    Regular diet.    Decrease your activity today and until after your appointment.    No heavy lifting or straining, no pushing or pulling. (10# limit)    No driving today, or while taking pain medication, check with MD when you will be able to drive.    No intercourse, douching, or tampons.    Showers only, no tub baths.

## 2023-07-17 NOTE — PLAN OF CARE
Discharge instructions reviewed with patient/family, copy given.  Discharged per wheelchairto car accompanied by family.

## 2023-07-17 NOTE — PLAN OF CARE
Patient states she needs to use the bathroom.  Ambulated to BR well, urinated, changed pad - had minimal bleeding. Will get dressed to go home.

## 2023-07-17 NOTE — ANESTHESIA POSTPROCEDURE EVALUATION
Anesthesia Post Evaluation    Patient: Rachael Corrales    Procedure(s) Performed: Procedure(s) (LRB):  DILATION AND CURETTAGE, UTERUS, USING SUCTION (N/A)    Final Anesthesia Type: general      Patient location during evaluation: PACU  Patient participation: Yes- Able to Participate  Level of consciousness: awake and alert, awake and oriented  Post-procedure vital signs: reviewed and stable  Pain management: adequate  Airway patency: patent    PONV status at discharge: No PONV  Anesthetic complications: no      Cardiovascular status: blood pressure returned to baseline  Respiratory status: unassisted, room air and spontaneous ventilation  Hydration status: euvolemic  Follow-up not needed.          Vitals Value Taken Time   /89 07/17/23 0615   Temp 37 °C (98.6 °F) 07/17/23 0615   Pulse 78 07/17/23 0615   Resp 18 07/17/23 0615   SpO2 100 % 07/17/23 0615         No case tracking events are documented in the log.      Pain/Gulshan Score: No data recorded

## 2023-07-17 NOTE — ANESTHESIA PROCEDURE NOTES
Intubation    Date/Time: 7/17/2023 7:25 AM  Performed by: Oscar Alcala CRNA  Authorized by: Oscar Alcala CRNA     Intubation:     Induction:  Intravenous    Intubated:  Postinduction    Mask Ventilation:  Easy mask    Attempts:  1    Attempted By:  CRNA    Method of Intubation:  Direct    Blade:  Perla 2    Laryngeal View Grade: Grade I - full view of cords      Difficult Airway Encountered?: No      Complications:  None    Airway Device:  Oral endotracheal tube    Airway Device Size:  7.0    Style/Cuff Inflation:  Cuffed    Inflation Amount (mL):  6    Tube secured:  21    Secured at:  The lips    Placement Verified By:  Capnometry    Complicating Factors:  None    Findings Post-Intubation:  BS equal bilateral and atraumatic/condition of teeth unchanged

## 2023-07-17 NOTE — INTERVAL H&P NOTE
The patient has been examined and the H&P has been reviewed:    I concur with the findings and no changes have occurred since H&P was written.    Anesthesia/Surgery risks, benefits and alternative options discussed and understood by patient/family.    No complaints desires genetic testing on POC, papers signed.       There are no hospital problems to display for this patient.

## 2023-07-17 NOTE — OP NOTE
DATE OF PROCEDURE: 7/17/2023    PRE-OP DIAGNOSIS:  Missed ab [O02.1]    POST-OP DIAGNOSIS:  Post-Op Diagnosis Codes:     * Missed ab [O02.1]    Procedure:   Suction Dilation and Curettage    Surgeon:   Yoanna Oshea MD    Estimated Blood Loss:  20 cc    Findings:   Normal appearing vagina no vaginal discharge, cervix nofriable    Procedure:   After consent were reviewed, the patient was taken to the operative room and placed on the OR table.  A time out was held.  General anesthesia was induced.     The patient was placed in the dorsal lithotomy position and prepped and draped in the standard sterile fashion.  A weighted speculum was placed in the posterior vagina.  The cervix was identified and grasped with a single tooth tenaculum.    The cervix was then dilated and a 9 mm curette inserted and carefully advanced to the fundus.  Suction was applied and products of conception removed. Bleeding was minimal. Instruments were removed.  Tenaculum sight was hemostatic.     The procedure was terminated.  All instruments were removed.  Counts were correct.  Patient was awakened and taken to the recovery room in stable condition having tolerated the procedure well.

## 2023-07-20 NOTE — DISCHARGE SUMMARY
[facility]  Discharge Note  Short Stay    Procedure(s) (LRB):  DILATION AND CURETTAGE, UTERUS, USING SUCTION (N/A)      OUTCOME: Patient tolerated treatment/procedure well without complication and is now ready for discharge.    DISPOSITION: Home or Self Care    FINAL DIAGNOSIS:  Missed ab    FOLLOWUP: In clinic    DISCHARGE INSTRUCTIONS:  No discharge procedures on file.      Clinical Reference Documents Added to Patient Instructions         Document    DILATION AND CURETTAGE (D AND C) (ENGLISH)            TIME SPENT ON DISCHARGE: 10  minutes

## 2023-08-02 NOTE — PROGRESS NOTES
Chief Complaint:  Post-op Evaluation (Suction D&C on 23.  )    History of Present Illness:  Patient is a 37 y.o.  presents to review pathology from recent suction d&c secondary to complete ab.      LMP: irreg. since SAB  Frequency: n/a   Cycle Length: n/a   Flow: n/a  Intermenstrual Bleeding: No  Postcoital Bleeding: No  Dysmenorrhea: No  Sexually Active: yes   Dyspareunia: No  Contraception: none   H/o STI: HPV  Last pap: 3/30/23 NIL  H/o abnl pap prior to current: Yes   Colposcopy: no  Gardasil: 0/3   MMG: n/a  H/o abnl MMG: n/a  Colonoscopy: n/a      Review of systems:  General/Constitutional: Chills denies. Fatigue/weakness denies. Fever denies. Night sweats denies. Hot flashes denies  Gastrointestinal: Abdominal pain denies. Blood in stool denies. Constipation denies. Diarrhea denies. Heartburn denies. Nausea denies. Vomiting denies   Genitourinary: Incontinence denies. Blood in urine denies. Frequent urination denies. Urgency denies. Painful urination denies. Nocturia denies   Gynecologic: Irregular menses denies. Heavy bleeding  denies. Painful menses denies. Vaginal discharge denies.Vaginal odor denies. Vaginal itching/Irritation denies. Vaginal lesion denies.  Pelvic pain denies. Decreased libido denies. Vulvar lesion denies. Prolapse of genital organs denies. Painful intercourse denies. Postcoital bleeding denies   Psychiatric: Mood lability denies. Depressed mood denies. Suicidal thoughts denies. Anxiety denies. Overwhelmed denies. Appetite normal. Energy level normal     OB History    Para Term  AB Living   4 3 3 0 1 3   SAB IAB Ectopic Multiple Live Births   1 0 0 0 3      # 1 - Date: 06, Sex: Female, Weight: 3.6 kg (7 lb 15 oz), GA: None, Delivery: Vaginal, Spontaneous, Apgar1: None, Apgar5: None, Living: Living, Birth Comments: None    # 2 - Date: 09, Sex: Female, Weight: 2.608 kg (5 lb 12 oz), GA: 37w0d, Delivery: Vaginal, Spontaneous, Apgar1: None, Apgar5: None,  "Living: Living, Birth Comments: None    # 3 - Date: 19, Sex: Male, Weight: 3.345 kg (7 lb 6 oz), GA: None, Delivery: Vaginal, Spontaneous, Apgar1: None, Apgar5: None, Living: Living, Birth Comments: None    # 4 - Date: 23, Sex: Unknown, Weight: None, GA: None, Delivery: Spontaneous , Apgar1: None, Apgar5: None, Living: Fetal Demise, Birth Comments: Suction D&C      Past Medical History:   Diagnosis Date    Mental disorder     Missed  2023       Current Outpatient Medications:     cholecalciferol, vitamin D3, (VITAMIN D3) 25 mcg (1,000 unit) capsule, Take 1 capsule (1,000 Units total) by mouth once daily. (Patient not taking: Reported on 8/3/2023), Disp: 30 capsule, Rfl: 3    HYDROcodone-acetaminophen (NORCO) 5-325 mg per tablet, Take 1 tablet by mouth every 6 (six) hours as needed., Disp: , Rfl:     ibuprofen 20 mg/mL oral liquid, SMARTSI Milliliter(s) By Mouth Every 6 Hours PRN, Disp: , Rfl:     lisdexamfetamine (VYVANSE) 60 MG capsule, , Disp: , Rfl:     miSOPROStoL (CYTOTEC) 200 MCG Tab, SMARTSIG:3 Tablet(s) Sublingual Every 3 Hours, Disp: , Rfl:     norgestimate-ethinyl estradioL (ORTHO-CYCLEN) 0.25-35 mg-mcg per tablet, Take 1 tablet by mouth once daily., Disp: 30 tablet, Rfl: 2    ondansetron (ZOFRAN-ODT) 4 MG TbDL, 4 mg every 4 (four) hours as needed., Disp: , Rfl:       Physical Exam:  /76 (BP Location: Right arm, Patient Position: Sitting, BP Method: Medium (Manual))   Temp 97.9 °F (36.6 °C) (Temporal)   Ht 5' 4" (1.626 m)   Wt 99.6 kg (219 lb 9.3 oz)   LMP 2023 (Exact Date)   Breastfeeding No   BMI 37.69 kg/m²     Constitutional: General appearance: healthy, well-nourished and well-developed   Psychiatric: Orientation to time, place and person. Normal mood and affect and active, alert       Assessment/Plan:  1. Complete   Reviewed pathology with patient, see below  Educated on down syndrome risks is 1 in 130 and this increases with maternal " age  MBT: A-/neg, s/p rhogam 7/7/23    Suction d&c 7/17/23  - abnormal male; trisomy 21 detected    2.) Contra care mngt  Explained common options for contraception including natural family planning, barrier methods, depo-provera, ocps,  patch, nuvaring, IUD, Nexplanon, and sterilization.        Discussed that pills should be taken at the same time every day to minimize breakthrough bleeding and to expect breakthrough bleeding for the first 3 months and then it should resolve. If BTB continues after 3 months, a change may be necessary.        Advised patient that smoking is harmful due to increased risks of stroke, heart attack and blood clots when taking pills. Patient to contact us immediately if she experiences severe abdominal pain, severe chest pain, severe headaches, eye-visual changes, severe leg pain or SOB.       Discussed that birth control do not protect against STDs.    Rx: Sprintec  RTC 3 months

## 2023-08-03 ENCOUNTER — OFFICE VISIT (OUTPATIENT)
Dept: OBSTETRICS AND GYNECOLOGY | Facility: CLINIC | Age: 38
End: 2023-08-03
Payer: COMMERCIAL

## 2023-08-03 VITALS
WEIGHT: 219.56 LBS | TEMPERATURE: 98 F | BODY MASS INDEX: 37.48 KG/M2 | SYSTOLIC BLOOD PRESSURE: 122 MMHG | HEIGHT: 64 IN | DIASTOLIC BLOOD PRESSURE: 76 MMHG

## 2023-08-03 DIAGNOSIS — O03.9 COMPLETE ABORTION: Primary | ICD-10-CM

## 2023-08-03 DIAGNOSIS — Z30.9 ENCOUNTER FOR CONTRACEPTIVE MANAGEMENT, UNSPECIFIED TYPE: ICD-10-CM

## 2023-08-03 LAB
B-HCG UR QL: POSITIVE
CTP QC/QA: YES

## 2023-08-03 PROCEDURE — 3074F PR MOST RECENT SYSTOLIC BLOOD PRESSURE < 130 MM HG: ICD-10-PCS | Mod: CPTII,,, | Performed by: OBSTETRICS & GYNECOLOGY

## 2023-08-03 PROCEDURE — 3078F DIAST BP <80 MM HG: CPT | Mod: CPTII,,, | Performed by: OBSTETRICS & GYNECOLOGY

## 2023-08-03 PROCEDURE — 3078F PR MOST RECENT DIASTOLIC BLOOD PRESSURE < 80 MM HG: ICD-10-PCS | Mod: CPTII,,, | Performed by: OBSTETRICS & GYNECOLOGY

## 2023-08-03 PROCEDURE — 99024 PR POST-OP FOLLOW-UP VISIT: ICD-10-PCS | Mod: ,,, | Performed by: OBSTETRICS & GYNECOLOGY

## 2023-08-03 PROCEDURE — 1159F MED LIST DOCD IN RCRD: CPT | Mod: CPTII,,, | Performed by: OBSTETRICS & GYNECOLOGY

## 2023-08-03 PROCEDURE — 81025 POCT URINE PREGNANCY: ICD-10-PCS | Mod: ,,, | Performed by: OBSTETRICS & GYNECOLOGY

## 2023-08-03 PROCEDURE — 3008F PR BODY MASS INDEX (BMI) DOCUMENTED: ICD-10-PCS | Mod: CPTII,,, | Performed by: OBSTETRICS & GYNECOLOGY

## 2023-08-03 PROCEDURE — 81025 URINE PREGNANCY TEST: CPT | Mod: ,,, | Performed by: OBSTETRICS & GYNECOLOGY

## 2023-08-03 PROCEDURE — 3074F SYST BP LT 130 MM HG: CPT | Mod: CPTII,,, | Performed by: OBSTETRICS & GYNECOLOGY

## 2023-08-03 PROCEDURE — 1159F PR MEDICATION LIST DOCUMENTED IN MEDICAL RECORD: ICD-10-PCS | Mod: CPTII,,, | Performed by: OBSTETRICS & GYNECOLOGY

## 2023-08-03 PROCEDURE — 3008F BODY MASS INDEX DOCD: CPT | Mod: CPTII,,, | Performed by: OBSTETRICS & GYNECOLOGY

## 2023-08-03 PROCEDURE — 99024 POSTOP FOLLOW-UP VISIT: CPT | Mod: ,,, | Performed by: OBSTETRICS & GYNECOLOGY

## 2023-08-03 RX ORDER — NORGESTIMATE AND ETHINYL ESTRADIOL 0.25-0.035
1 KIT ORAL DAILY
Qty: 30 TABLET | Refills: 2 | Status: SHIPPED | OUTPATIENT
Start: 2023-08-03 | End: 2023-10-11

## 2023-09-21 ENCOUNTER — OFFICE VISIT (OUTPATIENT)
Dept: OBSTETRICS AND GYNECOLOGY | Facility: CLINIC | Age: 38
End: 2023-09-21
Payer: COMMERCIAL

## 2023-09-21 ENCOUNTER — LAB VISIT (OUTPATIENT)
Dept: LAB | Facility: HOSPITAL | Age: 38
End: 2023-09-21
Attending: OBSTETRICS & GYNECOLOGY
Payer: COMMERCIAL

## 2023-09-21 VITALS
BODY MASS INDEX: 36.51 KG/M2 | TEMPERATURE: 98 F | DIASTOLIC BLOOD PRESSURE: 78 MMHG | WEIGHT: 213.88 LBS | SYSTOLIC BLOOD PRESSURE: 118 MMHG | HEIGHT: 64 IN

## 2023-09-21 DIAGNOSIS — N91.5 OLIGOMENORRHEA, UNSPECIFIED TYPE: Primary | ICD-10-CM

## 2023-09-21 DIAGNOSIS — N91.5 OLIGOMENORRHEA, UNSPECIFIED TYPE: ICD-10-CM

## 2023-09-21 LAB
B-HCG FREE SERPL-ACNC: NORMAL MIU/ML
B-HCG UR QL: POSITIVE
CTP QC/QA: YES

## 2023-09-21 PROCEDURE — 81025 POCT URINE PREGNANCY: ICD-10-PCS | Mod: ,,, | Performed by: OBSTETRICS & GYNECOLOGY

## 2023-09-21 PROCEDURE — 3008F PR BODY MASS INDEX (BMI) DOCUMENTED: ICD-10-PCS | Mod: CPTII,,, | Performed by: OBSTETRICS & GYNECOLOGY

## 2023-09-21 PROCEDURE — 3078F DIAST BP <80 MM HG: CPT | Mod: CPTII,,, | Performed by: OBSTETRICS & GYNECOLOGY

## 2023-09-21 PROCEDURE — 3008F BODY MASS INDEX DOCD: CPT | Mod: CPTII,,, | Performed by: OBSTETRICS & GYNECOLOGY

## 2023-09-21 PROCEDURE — 99213 OFFICE O/P EST LOW 20 MIN: CPT | Mod: 24,,, | Performed by: OBSTETRICS & GYNECOLOGY

## 2023-09-21 PROCEDURE — 3074F SYST BP LT 130 MM HG: CPT | Mod: CPTII,,, | Performed by: OBSTETRICS & GYNECOLOGY

## 2023-09-21 PROCEDURE — 1159F MED LIST DOCD IN RCRD: CPT | Mod: CPTII,,, | Performed by: OBSTETRICS & GYNECOLOGY

## 2023-09-21 PROCEDURE — 81025 URINE PREGNANCY TEST: CPT | Mod: ,,, | Performed by: OBSTETRICS & GYNECOLOGY

## 2023-09-21 PROCEDURE — 3074F PR MOST RECENT SYSTOLIC BLOOD PRESSURE < 130 MM HG: ICD-10-PCS | Mod: CPTII,,, | Performed by: OBSTETRICS & GYNECOLOGY

## 2023-09-21 PROCEDURE — 99213 PR OFFICE/OUTPT VISIT, EST, LEVL III, 20-29 MIN: ICD-10-PCS | Mod: 24,,, | Performed by: OBSTETRICS & GYNECOLOGY

## 2023-09-21 PROCEDURE — 84702 CHORIONIC GONADOTROPIN TEST: CPT

## 2023-09-21 PROCEDURE — 36415 COLL VENOUS BLD VENIPUNCTURE: CPT

## 2023-09-21 PROCEDURE — 1159F PR MEDICATION LIST DOCUMENTED IN MEDICAL RECORD: ICD-10-PCS | Mod: CPTII,,, | Performed by: OBSTETRICS & GYNECOLOGY

## 2023-09-21 PROCEDURE — 3078F PR MOST RECENT DIASTOLIC BLOOD PRESSURE < 80 MM HG: ICD-10-PCS | Mod: CPTII,,, | Performed by: OBSTETRICS & GYNECOLOGY

## 2023-09-21 NOTE — PROGRESS NOTES
Chief Complaint:   UPT Confirmation     History of Present Illness:  Rachael Corrales is a 37 y.o. year old  s/p SAB 2023 presents c/o no cycle. S/p suction d&c 2023, states no vaginal bleeding since then. Sexually active. Denies vaginal bleeding, discharge or pelvic pain.       LMP: irreg. since SAB  Frequency: n/a   Cycle Length: n/a   Flow: n/a  Intermenstrual Bleeding: No  Postcoital Bleeding: No  Dysmenorrhea: No  Sexually Active: yes   Dyspareunia: No  Contraception: none   H/o STI: HPV  Last pap: 3/30/23 NIL  H/o abnl pap: Yes   Colposcopy: no  Gardasil: 0/3   MMG: n/a  H/o abnl MMG: n/a  Colonoscopy: n/a      Review of Systems:  General/Constitutional: Chills denies. Fatigue/weakness denies. Fever denies. Night sweats denies. Hot flashes denies.  Gastrointestinal: Abdominal pain denies. Blood in stool denies. Constipation denies. Diarrhea denies. Heartburn denies. Nausea denies. Vomiting denies.   Genitourinary: Incontinence denies. Blood in urine denies. Frequent urination denies. Urgency denies. Painful urination denies. Nocturia denies.  Gynecologic: Irregular menses denies. Heavy bleeding denies. Painful menses denies. Vaginal discharge denies. Vaginal odor denies. Vaginal itching/Irritation denies. Vaginal lesion denies.  Pelvic pain denies. Decreased libido denies. Vulvar lesion denies. Prolapse of genital organs denies. Painful intercourse denies. Postcoital bleeding denies.   Psychiatric: Mood lability denies. Depressed mood denies. Suicidal thoughts denies. Anxiety denies. Overwhelmed denies. Appetite normal. Energy level normal.    OB History    Para Term  AB Living   4 3 3 0 1 3   SAB IAB Ectopic Multiple Live Births   1 0 0 0 3      # 1 - Date: 06, Sex: Female, Weight: 3.6 kg (7 lb 15 oz), GA: None, Delivery: Vaginal, Spontaneous, Apgar1: None, Apgar5: None, Living: Living, Birth Comments: None    # 2 - Date: 09, Sex: Female, Weight: 2.608 kg (5 lb 12 oz), GA:  37w0d, Delivery: Vaginal, Spontaneous, Apgar1: None, Apgar5: None, Living: Living, Birth Comments: None    # 3 - Date: 19, Sex: Male, Weight: 3.345 kg (7 lb 6 oz), GA: None, Delivery: Vaginal, Spontaneous, Apgar1: None, Apgar5: None, Living: Living, Birth Comments: None    # 4 - Date: 23, Sex: Unknown, Weight: None, GA: None, Delivery: Spontaneous , Apgar1: None, Apgar5: None, Living: Fetal Demise, Birth Comments: Suction D&C      Past Medical History:   Diagnosis Date    Mental disorder     Missed  2023     Past Surgical History:   Procedure Laterality Date    DILATION AND CURETTAGE OF UTERUS USING SUCTION N/A 2023    Procedure: DILATION AND CURETTAGE, UTERUS, USING SUCTION;  Surgeon: Yoanna Oshea MD;  Location: [facility];  Service: OB/GYN;  Laterality: N/A;    WISDOM TOOTH EXTRACTION          Current Outpatient Medications:     prenatal vit no.124/iron/folic (PRENATAL VITAMIN ORAL), Take 1 tablet by mouth once daily., Disp: , Rfl:     cholecalciferol, vitamin D3, (VITAMIN D3) 25 mcg (1,000 unit) capsule, Take 1 capsule (1,000 Units total) by mouth once daily. (Patient not taking: Reported on 8/3/2023), Disp: 30 capsule, Rfl: 3    HYDROcodone-acetaminophen (NORCO) 5-325 mg per tablet, Take 1 tablet by mouth every 6 (six) hours as needed., Disp: , Rfl:     ibuprofen 20 mg/mL oral liquid, SMARTSI Milliliter(s) By Mouth Every 6 Hours PRN, Disp: , Rfl:     lisdexamfetamine (VYVANSE) 60 MG capsule, , Disp: , Rfl:     miSOPROStoL (CYTOTEC) 200 MCG Tab, SMARTSIG:3 Tablet(s) Sublingual Every 3 Hours, Disp: , Rfl:     norgestimate-ethinyl estradioL (ORTHO-CYCLEN) 0.25-35 mg-mcg per tablet, Take 1 tablet by mouth once daily., Disp: 30 tablet, Rfl: 2    ondansetron (ZOFRAN-ODT) 4 MG TbDL, 4 mg every 4 (four) hours as needed., Disp: , Rfl:     Social History     Socioeconomic History    Marital status:    Tobacco Use    Smoking status: Never     Passive exposure: Never     "Smokeless tobacco: Never   Substance and Sexual Activity    Alcohol use: Yes     Comment: SELDOM    Drug use: Never    Sexual activity: Not Currently     Partners: Male     Birth control/protection: None         Physical Exam:  /78 (BP Location: Right arm, Patient Position: Sitting, BP Method: Large (Manual))   Temp 97.7 °F (36.5 °C) (Temporal)   Ht 5' 4" (1.626 m)   Wt 97 kg (213 lb 13.5 oz)   LMP  (LMP Unknown)   Breastfeeding No   BMI 36.71 kg/m²     Constitutional: General appearance: healthy, well-nourished and well-developed   Psychiatric:  Orientation to time, place and person. Normal mood and affect and active, alert   Abdomen: Auscultation/Inspection/Palpation: No tenderness or masses. Soft, nondistended       Assessment/Plan:  1. Oligomenorrhea, unspecified type  UPT+   S/p suction d&c 7/2023  Educated   No KENNEDY   PNV, folic acid   Pain/fever/bleeding prec   HCG quant today  Will call with results    "

## 2023-09-25 ENCOUNTER — INITIAL PRENATAL (OUTPATIENT)
Dept: OBSTETRICS AND GYNECOLOGY | Facility: CLINIC | Age: 38
End: 2023-09-25
Payer: COMMERCIAL

## 2023-09-25 VITALS
TEMPERATURE: 98 F | SYSTOLIC BLOOD PRESSURE: 122 MMHG | WEIGHT: 212.5 LBS | BODY MASS INDEX: 36.28 KG/M2 | HEIGHT: 64 IN | DIASTOLIC BLOOD PRESSURE: 78 MMHG

## 2023-09-25 DIAGNOSIS — Z67.91 RH NEGATIVE, ANTEPARTUM: ICD-10-CM

## 2023-09-25 DIAGNOSIS — O26.899 RH NEGATIVE, ANTEPARTUM: ICD-10-CM

## 2023-09-25 DIAGNOSIS — O09.299 H/O MISCARRIAGE, CURRENTLY PREGNANT: Primary | ICD-10-CM

## 2023-09-25 DIAGNOSIS — O99.210 MATERNAL OBESITY AFFECTING PREGNANCY, ANTEPARTUM: ICD-10-CM

## 2023-09-25 DIAGNOSIS — O09.521 MULTIGRAVIDA OF ADVANCED MATERNAL AGE IN FIRST TRIMESTER: ICD-10-CM

## 2023-09-25 DIAGNOSIS — Z3A.01 6 WEEKS GESTATION OF PREGNANCY: ICD-10-CM

## 2023-09-25 LAB
BILIRUB UR QL STRIP: NEGATIVE
GLUCOSE UR QL STRIP: NEGATIVE
KETONES UR QL STRIP: NEGATIVE
LEUKOCYTE ESTERASE UR QL STRIP: NEGATIVE
PH, POC UA: 6
POC BLOOD, URINE: POSITIVE
POC NITRATES, URINE: NEGATIVE
PROT UR QL STRIP: NEGATIVE
SP GR UR STRIP: >=1.03 (ref 1–1.03)
UROBILINOGEN UR STRIP-ACNC: 0.2 (ref 0.1–1.1)

## 2023-09-25 PROCEDURE — 87088 URINE BACTERIA CULTURE: CPT | Performed by: OBSTETRICS & GYNECOLOGY

## 2023-09-25 PROCEDURE — 0500F PR INITIAL PRENATAL CARE VISIT: ICD-10-PCS | Mod: ,,, | Performed by: OBSTETRICS & GYNECOLOGY

## 2023-09-25 PROCEDURE — 0500F INITIAL PRENATAL CARE VISIT: CPT | Mod: ,,, | Performed by: OBSTETRICS & GYNECOLOGY

## 2023-09-25 PROCEDURE — 76817 TRANSVAGINAL US OBSTETRIC: CPT | Mod: 26,,, | Performed by: OBSTETRICS & GYNECOLOGY

## 2023-09-25 PROCEDURE — 76817 US OB/GYN EXTENDED PROCEDURE (VIEWPOINT): ICD-10-PCS | Mod: 26,,, | Performed by: OBSTETRICS & GYNECOLOGY

## 2023-09-25 NOTE — PROGRESS NOTES
Chief Complaint:  Initial Prenatal Visit     History of Present Illness:  Rachael Corrales is a 37 y.o. year old  presents for her new ob. No vaginal bleeding since her suction d&c 2023. She is doing well.       LMP: irreg. since SAB  Frequency: n/a   Cycle Length: n/a   Flow: n/a  Intermenstrual Bleeding: No  Postcoital Bleeding: No  Dysmenorrhea: No  Sexually Active: yes   Dyspareunia: No  Contraception: none   H/o STI: HPV  Last pap: 3/30/23 NIL  H/o abnl pap: Yes   Colposcopy: no  Gardasil: 0/3   MMG: n/a  H/o abnl MMG: n/a  Colonoscopy: n/a      Review of Systems:  General/Constitutional: Chills denies. Fatigue/weakness denies. Fever denies. Night sweats denies. Hot flashes denies.   Respiratory: Cough denies. Hemoptysis denies. SOB denies. Sputum production denies. Wheezing denies.   Cardiovascular: Chest pain denies. Dizziness denies. Palpitations denies. Swelling in hands/feet denies.    Gastrointestinal: Abdominal pain denies. Blood in stool denies. Constipation denies. Diarrhea denies. Heartburn denies. Nausea denies. Vomiting denies.   Genitourinary: Incontinence denies. Blood in urine denies. Frequent urination denies. Painful urination denies.  Urinary urgency denies.  Nocturia denies.   Gynecologic: Irregular menses denies. Heavy bleeding denies. Painful menses denies. Vaginal discharge denies. Vaginal odor denies. Vaginal itching denies. Vaginal lesion denies.  Pelvic pain denies. Decreased libido denies. Vulvar lesion denies. Prolapse of genital organs denies. Painful intercourse denies. Postcoital bleeding denies.   Psychiatric: Depression denies. Anxiety denies.     OB History    Para Term  AB Living   5 3 3 0 1 3   SAB IAB Ectopic Multiple Live Births   1 0 0 0 3      # 1 - Date: 06, Sex: Female, Weight: 3.6 kg (7 lb 15 oz), GA: None, Delivery: Vaginal, Spontaneous, Apgar1: None, Apgar5: None, Living: Living, Birth Comments: None    # 2 - Date: 09, Sex: Female,  Weight: 2.608 kg (5 lb 12 oz), GA: 37w0d, Delivery: Vaginal, Spontaneous, Apgar1: None, Apgar5: None, Living: Living, Birth Comments: None    # 3 - Date: 19, Sex: Male, Weight: 3.345 kg (7 lb 6 oz), GA: None, Delivery: Vaginal, Spontaneous, Apgar1: None, Apgar5: None, Living: Living, Birth Comments: None    # 4 - Date: 23, Sex: Unknown, Weight: None, GA: None, Delivery: Spontaneous , Apgar1: None, Apgar5: None, Living: Fetal Demise, Birth Comments: Suction D&C    # 5 - Date: None, Sex: None, Weight: None, GA: None, Delivery: None, Apgar1: None, Apgar5: None, Living: None, Birth Comments: None      Past Medical History:   Diagnosis Date    Mental disorder     Missed  2023       Current Outpatient Medications:     prenatal vit no.124/iron/folic (PRENATAL VITAMIN ORAL), Take 1 tablet by mouth once daily., Disp: , Rfl:     cholecalciferol, vitamin D3, (VITAMIN D3) 25 mcg (1,000 unit) capsule, Take 1 capsule (1,000 Units total) by mouth once daily. (Patient not taking: Reported on 8/3/2023), Disp: 30 capsule, Rfl: 3    HYDROcodone-acetaminophen (NORCO) 5-325 mg per tablet, Take 1 tablet by mouth every 6 (six) hours as needed., Disp: , Rfl:     lisdexamfetamine (VYVANSE) 60 MG capsule, , Disp: , Rfl:     norgestimate-ethinyl estradioL (ORTHO-CYCLEN) 0.25-35 mg-mcg per tablet, Take 1 tablet by mouth once daily. (Patient not taking: Reported on 2023), Disp: 30 tablet, Rfl: 2    ondansetron (ZOFRAN-ODT) 4 MG TbDL, 4 mg every 4 (four) hours as needed., Disp: , Rfl:     Review of patient's allergies indicates:  No Known Allergies    Past Surgical History:   Procedure Laterality Date    DILATION AND CURETTAGE OF UTERUS USING SUCTION N/A 2023    Procedure: DILATION AND CURETTAGE, UTERUS, USING SUCTION;  Surgeon: Yoanna Oshea MD;  Location: [facility] OR;  Service: OB/GYN;  Laterality: N/A;    WISDOM TOOTH EXTRACTION       Family History   Problem Relation Age of Onset    Breast cancer  "Maternal Grandmother     Colon cancer Neg Hx     Ovarian cancer Neg Hx     Uterine cancer Neg Hx     Cervical cancer Neg Hx      Social History     Socioeconomic History    Marital status:    Tobacco Use    Smoking status: Never     Passive exposure: Never    Smokeless tobacco: Never   Substance and Sexual Activity    Alcohol use: Yes     Comment: SELDOM    Drug use: Never    Sexual activity: Not Currently     Partners: Male     Birth control/protection: None       Physical Exam:  /78 (BP Location: Right arm, Patient Position: Sitting, BP Method: Large (Manual))   Temp 97.5 °F (36.4 °C) (Temporal)   Ht 5' 4" (1.626 m)   Wt 96.4 kg (212 lb 8.4 oz)   LMP  (LMP Unknown)   BMI 36.48 kg/m²     Chaperone: present.       General appearance: healthy, well-nourished and well-developed     Psychiatric: Orientation to time, place and person. Normal mood and affect and active, alert     Skin:   Appearance: no rashes or lesions.     Neck:   Neck: supple, FROM, trachea midline. and no masses   Thyroid: no enlargement or nodules and non-tender.       Cardiovascular:   Auscultation: RRR and no murmur.   Peripheral Vascular: no varicosities, LLE edema, RLE edema, calf tenderness, and palpable cord and pedal pulses intact.     Lungs:   Respiratory effort: no intercostal retractions or accessory muscle usage.   Auscultation: no wheezing, rales/crackles, or rhonchi and clear to auscultation.     Breast:   Inspection/Palpation: no tenderness, discrete/distinct masses, skin changes, or abnormal secretions. Nipple appearance normal.     Abdomen:   Auscultation/Inspection/Palpation: no hepatomegaly, splenomegaly, masses, tenderness or CVA tenderness and soft, non-distended bowel sounds preset.    Hernia: no palpable hernias.     Female Genitalia:   Vulva: no masses, tenderness or lesions   Bladder/Urethra: no urethral discharge or mass, normal meatus, bladder non-distended.   Vagina: no tenderness, erythema, cystocele, " rectocele, abnormal vaginal discharge or vesicle(s) or ulcers   Cervix: no discharge, no cervical lacerations noted or motion tenderness and grossly normal   Uterus: normal size and shape and midline, non-tender, and no uterine prolapse.   Adnexa/Parametria: no parametrial tenderness or mass, no adnexal tenderness or ovarian mass.     Lymph Nodes:   Palpation: non tender submandibular nodes, axillary nodes, or inguinal nodes.     Rectal Exam:   Rectum: normal perianal skin.       Assessment/Plan:  AMA/ h/o SAB with confirmed T21  Educated  Consult to MFM NT  Desires NIPS @ next visit  81mg ASA @ 12 wks  2. Maternal obesity affecting pregnancy, antepartum  Patient educated on risks and complications of obesity and pregnancy including but not limited to first trimester miscarriage recurrent miscarriages, congenital anomalies, HTN disorders, gestational diabetes, macrosomia, PTL&D, higher risk of fetal demise in-utero and higher risk of CS (and wound complication including infection breakdown) with obesity.    3. Rh negative, antepartum  Rhogam @28wks  Sp Rhogam 7/7/23    4. 6 weeks gestation of pregnancy  We discussed diet/supplements and modifiable risk factors.     Patients advised to continue moderate physical activity.       Patients will report unusual headaches, visual disturbances, pelvic pain or cramping, vaginal bleeding, rupture of membranes, extreme swelling in hands or face, diminished urine volume, any prolonged illness or infection, or persistent symptoms of labor.     Discussed with patient on the uncertainty of COVID 19 in relation to pregnancy complications with patient and unborn fetus.  Advised to CDC guidelines of social distancing, patient to stay home as much as possible, and limit contact with others.  Travel restrictions discussed.  Patient to call office if she has a temperature over 100.4 cough shortness of breath or GI symptoms. Patients educated if she feels she is in an emergency to call  911.      Educated patient it is strongly advised by American Congress of Obstetrics and Gyne[employer] as well as Society for Maternal Fetal Medicine Specialists to receive the COVID 19 vaccine. Educated patient that pregnant patients are at high risk of severe COVID 19 infections, including but not limited to death.     +  GA: unsure of LMP; 6w1d  SHELLEY: 5/19/2024  PNL (will hold), PNV, folic acid   Desires genetic testing, will give on RTC  Follow up 2 weeks for scan

## 2023-09-28 LAB — BACTERIA UR CULT: NORMAL

## 2023-10-10 NOTE — PROGRESS NOTES
Chief Complaint:  Rescan    History of Present Illness:  Rachael Corrales is a 37 y.o. year old  at 8w2d presents for her ob exam. She is doing well. No complaints today.       Review of Systems:  General/Constitutional: Fever denies. Headache denies.     Skin: Rash denies.   Respiratory: Cough denies. SOB denies. Wheezing denies .   Cardiovascular: Chest pain denies. Dizziness denies. Palpitations denies. Swelling in hands/feet denies.    Gastrointestinal: Abdominal pain denies. Constipation denies. Diarrhea denies. Heartburn denies. Nausea denies. Vomiting denies.  Genitourinary: Painful urination denies.   Gynecologic: Vaginal bleeding denies. Vaginal discharge Normal. Leaking amniotic fluid denies.  Contractions denies.  Psychiatric: Depressed mood denies. Suicidal thoughts denies.     OB History    Para Term  AB Living   5 3 3 0 1 3   SAB IAB Ectopic Multiple Live Births   1 0 0 0 3      # 1 - Date: 06, Sex: Female, Weight: 3.6 kg (7 lb 15 oz), GA: None, Delivery: Vaginal, Spontaneous, Apgar1: None, Apgar5: None, Living: Living, Birth Comments: None    # 2 - Date: 09, Sex: Female, Weight: 2.608 kg (5 lb 12 oz), GA: 37w0d, Delivery: Vaginal, Spontaneous, Apgar1: None, Apgar5: None, Living: Living, Birth Comments: None    # 3 - Date: 19, Sex: Male, Weight: 3.345 kg (7 lb 6 oz), GA: None, Delivery: Vaginal, Spontaneous, Apgar1: None, Apgar5: None, Living: Living, Birth Comments: None    # 4 - Date: 23, Sex: Unknown, Weight: None, GA: None, Delivery: Spontaneous , Apgar1: None, Apgar5: None, Living: Fetal Demise, Birth Comments: Suction D&C    # 5 - Date: None, Sex: None, Weight: None, GA: None, Delivery: None, Apgar1: None, Apgar5: None, Living: None, Birth Comments: None        Current Outpatient Medications:     ondansetron (ZOFRAN-ODT) 4 MG TbDL, 4 mg every 4 (four) hours as needed., Disp: , Rfl:     prenatal vit no.124/iron/folic (PRENATAL VITAMIN ORAL), Take  "1 tablet by mouth once daily., Disp: , Rfl:     cholecalciferol, vitamin D3, (VITAMIN D3) 25 mcg (1,000 unit) capsule, Take 1 capsule (1,000 Units total) by mouth once daily. (Patient not taking: Reported on 8/3/2023), Disp: 30 capsule, Rfl: 3    HYDROcodone-acetaminophen (NORCO) 5-325 mg per tablet, Take 1 tablet by mouth every 6 (six) hours as needed., Disp: , Rfl:     lisdexamfetamine (VYVANSE) 60 MG capsule, , Disp: , Rfl:     norgestimate-ethinyl estradioL (ORTHO-CYCLEN) 0.25-35 mg-mcg per tablet, Take 1 tablet by mouth once daily. (Patient not taking: Reported on 9/25/2023), Disp: 30 tablet, Rfl: 2    Physical Exam:  /74   Temp 98.1 °F (36.7 °C)   Ht 5' 4" (1.626 m)   Wt 99.4 kg (219 lb 3.2 oz)   LMP  (LMP Unknown)   BMI 37.63 kg/m²     Constitutional: General appearance: healthy, well-nourished and well-developed   Psychiatric:  Orientation to time, place and person. Normal mood and affect and active, alert   Abdomen: Auscultation/Inspection/Palpation: Gravid. No tenderness or masses. Soft, nondistended   Extremities: no CCE    FHT: 160      Assessment/Plan  1. Multigravida of advanced maternal age in first trimester/h/o Sab with T21  - Educated  - dw pt NIPS  - MFM appt 11/8/23      2. Other obesity affecting pregnancy, antepartum  Patient educated on risks and complications of obesity and pregnancy including but not limited to first trimester miscarriage recurrent miscarriages, congenital anomalies, HTN disorders, gestational diabetes, macrosomia, PTL&D, higher risk of fetal demise in-utero and higher risk of CS (and wound complication including infection breakdown) with obesity.  Osul A1c      3. Rh negative, antepartum  Rhogam @28wks    4. 8 weeks gestation of pregnancy  Instructed on weight gain, healthy exercise, vitamins, avoidance of drugs tobacco and alcohol. Pain, fever, bleeding precautions.        Discussed with patient travel precautions.       Discussed with patient on the uncertainty " of COVID 19 in relation to pregnancy complications with patient and unborn fetus.  Advised to CDC guidelines of social distancing, patient to stay home as much as possible, and limit contact with others.  Travel restrictions discussed.  Patient to call office if she has a temperature over 100.4 cough shortness of breath or GI symptoms. Patient educated if she feels she is in an emergency to call 911.        Educated patient it is strongly advised by American Congress of Obstetrics and Gynecology as well as Society for Maternal Fetal Medicine Specialists to receive the COVID 19 vaccine. Educated patient that pregnant patients are at high risk of severe COVID 19 infections, including but not limited to death.     The [place] preventative task force placed the following recommendations in 2016 which ACOG supports regarding utilization of low dose 81mg ASA starting at 14 weeks and continuing through pregnancy in high risk pregnant women. The utilization of this has been shown to reduce the risk for preeclampsia by 24% in clinical trials and reduce the risk of  birth by 14% and IUGR about 20%.     Begin/Cont. 81mg ASA    PNLramya, a1c  Desires genetic testing, will give on RTC  RTC 4 weeks

## 2023-10-11 ENCOUNTER — ROUTINE PRENATAL (OUTPATIENT)
Dept: OBSTETRICS AND GYNECOLOGY | Facility: CLINIC | Age: 38
End: 2023-10-11
Payer: COMMERCIAL

## 2023-10-11 VITALS
HEIGHT: 64 IN | TEMPERATURE: 98 F | SYSTOLIC BLOOD PRESSURE: 122 MMHG | WEIGHT: 219.19 LBS | BODY MASS INDEX: 37.42 KG/M2 | DIASTOLIC BLOOD PRESSURE: 74 MMHG

## 2023-10-11 DIAGNOSIS — O09.521 MULTIGRAVIDA OF ADVANCED MATERNAL AGE IN FIRST TRIMESTER: Primary | ICD-10-CM

## 2023-10-11 DIAGNOSIS — O99.210 OTHER OBESITY AFFECTING PREGNANCY, ANTEPARTUM: ICD-10-CM

## 2023-10-11 DIAGNOSIS — O26.899 RH NEGATIVE, ANTEPARTUM: ICD-10-CM

## 2023-10-11 DIAGNOSIS — Z67.91 RH NEGATIVE, ANTEPARTUM: ICD-10-CM

## 2023-10-11 DIAGNOSIS — Z82.79 FAMILY HISTORY OF DOWN SYNDROME: ICD-10-CM

## 2023-10-11 DIAGNOSIS — E66.8 OTHER OBESITY AFFECTING PREGNANCY, ANTEPARTUM: ICD-10-CM

## 2023-10-11 DIAGNOSIS — Z3A.08 8 WEEKS GESTATION OF PREGNANCY: ICD-10-CM

## 2023-10-11 PROCEDURE — 0502F SUBSEQUENT PRENATAL CARE: CPT | Mod: CPTII,,, | Performed by: OBSTETRICS & GYNECOLOGY

## 2023-10-11 PROCEDURE — 0502F PR SUBSEQUENT PRENATAL CARE: ICD-10-PCS | Mod: CPTII,,, | Performed by: OBSTETRICS & GYNECOLOGY

## 2023-10-16 ENCOUNTER — LAB VISIT (OUTPATIENT)
Dept: LAB | Facility: HOSPITAL | Age: 38
End: 2023-10-16
Attending: OBSTETRICS & GYNECOLOGY
Payer: COMMERCIAL

## 2023-10-16 DIAGNOSIS — Z3A.08 8 WEEKS GESTATION OF PREGNANCY: ICD-10-CM

## 2023-10-16 LAB
ABO AND RH: NORMAL
ANTIBODY SCREEN: NORMAL
ERYTHROCYTE [DISTWIDTH] IN BLOOD BY AUTOMATED COUNT: 12.5 % (ref 11–14.5)
EST. AVERAGE GLUCOSE BLD GHB EST-MCNC: 96.8 MG/DL (ref 70–115)
HBA1C MFR BLD: 5 % (ref 4–6)
HBV SURFACE AG SERPL QL IA: NEGATIVE
HBV SURFACE AG SERPL QL IA: NORMAL
HCT VFR BLD AUTO: 38.1 % (ref 36–48)
HCV AB SERPL QL IA: NONREACTIVE
HEMOGLOBIN BANDS: NORMAL
HGB BLD-MCNC: 13.2 G/DL (ref 11.8–16)
HIV 1+2 AB+HIV1 P24 AG SERPL QL IA: NONREACTIVE
MCH RBC QN AUTO: 28.8 PG (ref 27–34)
MCHC RBC AUTO-ENTMCNC: 34.6 G/DL (ref 31–37)
MCV RBC AUTO: 83 FL (ref 79–99)
NRBC BLD AUTO-RTO: 0 %
PLATELET # BLD AUTO: 277 X10(3)/MCL (ref 140–371)
PMV BLD AUTO: 10.2 FL (ref 9.4–12.4)
RBC # BLD AUTO: 4.59 X10(6)/MCL (ref 4–5.1)
RUBELLA AB, IGG (OHS): 32.6 IU/ML
SPECIMEN OUTDATE: NORMAL
T PALLIDUM AB SER QL: NONREACTIVE
T4 FREE SERPL-MCNC: 1.14 NG/DL (ref 0.78–2.19)
WBC # SPEC AUTO: 11.3 X10(3)/MCL (ref 4–11.5)

## 2023-10-16 PROCEDURE — 87340 HEPATITIS B SURFACE AG IA: CPT

## 2023-10-16 PROCEDURE — 83036 HEMOGLOBIN GLYCOSYLATED A1C: CPT

## 2023-10-16 PROCEDURE — 86762 RUBELLA ANTIBODY: CPT

## 2023-10-16 PROCEDURE — 86803 HEPATITIS C AB TEST: CPT

## 2023-10-16 PROCEDURE — 83020 HEMOGLOBIN ELECTROPHORESIS: CPT

## 2023-10-16 PROCEDURE — 36415 COLL VENOUS BLD VENIPUNCTURE: CPT

## 2023-10-16 PROCEDURE — 85027 COMPLETE CBC AUTOMATED: CPT

## 2023-10-16 PROCEDURE — 86787 VARICELLA-ZOSTER ANTIBODY: CPT

## 2023-10-16 PROCEDURE — 87389 HIV-1 AG W/HIV-1&-2 AB AG IA: CPT

## 2023-10-16 PROCEDURE — 86780 TREPONEMA PALLIDUM: CPT

## 2023-10-16 PROCEDURE — 86850 RBC ANTIBODY SCREEN: CPT | Performed by: OBSTETRICS & GYNECOLOGY

## 2023-10-17 LAB
VZV IGG SER IA-ACNC: 3.6
VZV IGG SER QL IA: POSITIVE

## 2023-10-18 LAB
HGB A MFR BLD ELPH: 97.4 % (ref 95.8–98)
HGB A2 MFR BLD ELPH: 2.6 % (ref 2–3.3)
HGB F MFR BLD ELPH: 0 % (ref 0–0.9)
HGB FRACT BLD ELPH-IMP: NORMAL
M HPLC HB VARIANT, B: NORMAL

## 2023-10-23 ENCOUNTER — PATIENT MESSAGE (OUTPATIENT)
Dept: OBSTETRICS AND GYNECOLOGY | Facility: CLINIC | Age: 38
End: 2023-10-23

## 2023-10-23 ENCOUNTER — ROUTINE PRENATAL (OUTPATIENT)
Dept: OBSTETRICS AND GYNECOLOGY | Facility: CLINIC | Age: 38
End: 2023-10-23
Payer: COMMERCIAL

## 2023-10-23 VITALS
HEIGHT: 64 IN | WEIGHT: 220.69 LBS | TEMPERATURE: 98 F | BODY MASS INDEX: 37.68 KG/M2 | SYSTOLIC BLOOD PRESSURE: 118 MMHG | DIASTOLIC BLOOD PRESSURE: 80 MMHG

## 2023-10-23 DIAGNOSIS — O09.521 MULTIGRAVIDA OF ADVANCED MATERNAL AGE IN FIRST TRIMESTER: ICD-10-CM

## 2023-10-23 DIAGNOSIS — O26.891 PREGNANCY HEADACHE IN FIRST TRIMESTER: Primary | ICD-10-CM

## 2023-10-23 DIAGNOSIS — O99.210 OTHER OBESITY AFFECTING PREGNANCY, ANTEPARTUM: ICD-10-CM

## 2023-10-23 DIAGNOSIS — O26.899 RH NEGATIVE, ANTEPARTUM: ICD-10-CM

## 2023-10-23 DIAGNOSIS — Z67.91 RH NEGATIVE, ANTEPARTUM: ICD-10-CM

## 2023-10-23 DIAGNOSIS — Z3A.10 10 WEEKS GESTATION OF PREGNANCY: ICD-10-CM

## 2023-10-23 DIAGNOSIS — R51.9 PREGNANCY HEADACHE IN FIRST TRIMESTER: Primary | ICD-10-CM

## 2023-10-23 DIAGNOSIS — E66.8 OTHER OBESITY AFFECTING PREGNANCY, ANTEPARTUM: ICD-10-CM

## 2023-10-23 LAB
BILIRUB UR QL STRIP: NORMAL
GLUCOSE UR QL STRIP: NEGATIVE
KETONES UR QL STRIP: NORMAL
LEUKOCYTE ESTERASE UR QL STRIP: NEGATIVE
PH, POC UA: NORMAL
POC BLOOD, URINE: NORMAL
POC NITRATES, URINE: NEGATIVE
PROT UR QL STRIP: NEGATIVE
SP GR UR STRIP: NORMAL (ref 1–1.03)
UROBILINOGEN UR STRIP-ACNC: NORMAL (ref 0.3–2.2)

## 2023-10-23 PROCEDURE — 0500F INITIAL PRENATAL CARE VISIT: CPT | Mod: ,,, | Performed by: OBSTETRICS & GYNECOLOGY

## 2023-10-23 PROCEDURE — 0500F PR INITIAL PRENATAL CARE VISIT: ICD-10-PCS | Mod: ,,, | Performed by: OBSTETRICS & GYNECOLOGY

## 2023-10-23 RX ORDER — PROCHLORPERAZINE MALEATE 5 MG
5 TABLET ORAL EVERY 6 HOURS PRN
Qty: 30 TABLET | Refills: 0 | Status: SHIPPED | OUTPATIENT
Start: 2023-10-23 | End: 2024-01-17 | Stop reason: SDUPTHER

## 2023-10-23 NOTE — PROGRESS NOTES
Chief Complaint:  Routine Prenatal Visit (10.1 weeks)    History of Present Illness:  Rachael Corrales is a 37 y.o. year old  at 10w1d presents for her ob exam. She is doing well. C/o headache, no relief with tylenol.       Review of Systems:  General/Constitutional: Fever denies. Headache admits.     Skin: Rash denies.   Respiratory: Cough denies. SOB denies. Wheezing denies .   Cardiovascular: Chest pain denies. Dizziness denies. Palpitations denies. Swelling in hands/feet denies.    Gastrointestinal: Abdominal pain denies. Constipation denies. Diarrhea denies. Heartburn denies. Nausea denies. Vomiting denies.  Genitourinary: Painful urination denies.   Gynecologic: Vaginal bleeding denies. Vaginal discharge Normal. Leaking amniotic fluid denies.  Contractions denies.  Psychiatric: Depressed mood denies. Suicidal thoughts denies.     OB History    Para Term  AB Living   5 3 3 0 1 3   SAB IAB Ectopic Multiple Live Births   1 0 0 0 3      # 1 - Date: 06, Sex: Female, Weight: 3.6 kg (7 lb 15 oz), GA: None, Delivery: Vaginal, Spontaneous, Apgar1: None, Apgar5: None, Living: Living, Birth Comments: None    # 2 - Date: 09, Sex: Female, Weight: 2.608 kg (5 lb 12 oz), GA: 37w0d, Delivery: Vaginal, Spontaneous, Apgar1: None, Apgar5: None, Living: Living, Birth Comments: None    # 3 - Date: 19, Sex: Male, Weight: 3.345 kg (7 lb 6 oz), GA: None, Delivery: Vaginal, Spontaneous, Apgar1: None, Apgar5: None, Living: Living, Birth Comments: None    # 4 - Date: 23, Sex: Unknown, Weight: None, GA: None, Delivery: Spontaneous , Apgar1: None, Apgar5: None, Living: Fetal Demise, Birth Comments: Suction D&C    # 5 - Date: None, Sex: None, Weight: None, GA: None, Delivery: None, Apgar1: None, Apgar5: None, Living: None, Birth Comments: None        Current Outpatient Medications:     prenatal vit no.124/iron/folic (PRENATAL VITAMIN ORAL), Take 1 tablet by mouth once daily., Disp: , Rfl:  "    ondansetron (ZOFRAN-ODT) 4 MG TbDL, 4 mg every 4 (four) hours as needed., Disp: , Rfl:     Physical Exam:  /80 (BP Location: Right arm, Patient Position: Sitting, BP Method: Large (Manual))   Temp 97.9 °F (36.6 °C) (Temporal)   Ht 5' 4" (1.626 m)   Wt 100.1 kg (220 lb 10.9 oz)   LMP  (LMP Unknown)   BMI 37.88 kg/m²     Constitutional: General appearance: healthy, well-nourished and well-developed   Psychiatric:  Orientation to time, place and person. Normal mood and affect and active, alert   Abdomen: Auscultation/Inspection/Palpation: Gravid. No tenderness or masses. Soft, nondistended   Extremities: no CCE    FHT: 150      Assessment/Plan  1. Multigravida of advanced maternal age in first trimester  Educated  Keep appt [facility] 23    2. Other obesity affecting pregnancy, antepartum  Patient educated on risks and complications of obesity and pregnancy including but not limited to first trimester miscarriage recurrent miscarriages, congenital anomalies, HTN disorders, gestational diabetes, macrosomia, PTL&D, higher risk of fetal demise in-utero and higher risk of CS (and wound complication including infection breakdown) with obesity.    The [place] preventative task force placed the following recommendations in 2016 which ACOG supports regarding utilization of low dose 81mg ASA starting at 14 weeks and continuing through pregnancy in high risk pregnant women. The utilization of this has been shown to reduce the risk for preeclampsia by 24% in clinical trials and reduce the risk of  birth by 14% and IUGR about 20%.     Begin 81mg ASA @12-14wks    3. Rh negative, antepartum  Rhogam @28wks    4. Headache in pregnancy  Avoid stimulus, go in a dark quiet room     Hydration  ER precautions   Rx: compazine 5mg q6hrs    5. 10 weeks gestation of pregnancy  Instructed on weight gain, healthy exercise, vitamins, avoidance of drugs tobacco and alcohol. Pain, fever, bleeding precautions.        Discussed with " patient travel precautions.       Discussed with patient on the uncertainty of COVID 19 in relation to pregnancy complications with patient and unborn fetus.  Advised to CDC guidelines of social distancing, patient to stay home as much as possible, and limit contact with others.  Travel restrictions discussed.  Patient to call office if she has a temperature over 100.4 cough shortness of breath or GI symptoms. Patient educated if she feels she is in an emergency to call 911.        Educated patient it is strongly advised by American Congress of Obstetrics and Gynecology as well as Society for Maternal Fetal Medicine Specialists to receive the COVID 19 vaccine. Educated patient that pregnant patients are at high risk of severe COVID 19 infections, including but not limited to death.     RTC 3 weeks

## 2023-10-24 ENCOUNTER — PATIENT MESSAGE (OUTPATIENT)
Dept: OBSTETRICS AND GYNECOLOGY | Facility: CLINIC | Age: 38
End: 2023-10-24
Payer: COMMERCIAL

## 2023-10-25 ENCOUNTER — LAB VISIT (OUTPATIENT)
Dept: LAB | Facility: HOSPITAL | Age: 38
End: 2023-10-25
Attending: OBSTETRICS & GYNECOLOGY
Payer: COMMERCIAL

## 2023-10-25 DIAGNOSIS — Z3A.08 8 WEEKS GESTATION OF PREGNANCY: ICD-10-CM

## 2023-10-25 LAB — GLUCOSE 1H P 100 G GLC PO SERPL-MCNC: 129 MG/DL

## 2023-10-25 PROCEDURE — 82950 GLUCOSE TEST: CPT

## 2023-10-25 PROCEDURE — 36415 COLL VENOUS BLD VENIPUNCTURE: CPT

## 2023-11-06 DIAGNOSIS — O09.529 AMA (ADVANCED MATERNAL AGE) MULTIGRAVIDA 35+: Primary | ICD-10-CM

## 2023-11-07 ENCOUNTER — PATIENT MESSAGE (OUTPATIENT)
Dept: OBSTETRICS AND GYNECOLOGY | Facility: CLINIC | Age: 38
End: 2023-11-07
Payer: COMMERCIAL

## 2023-11-09 ENCOUNTER — PATIENT MESSAGE (OUTPATIENT)
Dept: OBSTETRICS AND GYNECOLOGY | Facility: CLINIC | Age: 38
End: 2023-11-09
Payer: COMMERCIAL

## 2023-11-13 NOTE — PROGRESS NOTES
Chief Complaint:  Routine Prenatal Visit    History of Present Illness:  Rachael Corrales is a 38 y.o. year old  at 13w2d presents for her ob exam. She is doing well. No complaints today.       Review of Systems:  General/Constitutional: Fever denies. Headache denies.     Skin: Rash denies.   Respiratory: Cough denies. SOB denies. Wheezing denies .   Cardiovascular: Chest pain denies. Dizziness denies. Palpitations denies. Swelling in hands/feet denies.    Gastrointestinal: Abdominal pain denies. Constipation denies. Diarrhea denies. Heartburn denies. Nausea denies. Vomiting denies.  Genitourinary: Painful urination denies.   Gynecologic: Vaginal bleeding denies. Vaginal discharge Normal. Leaking amniotic fluid denies.  Contractions denies.  Psychiatric: Depressed mood denies. Suicidal thoughts denies.     OB History    Para Term  AB Living   5 3 3 0 1 3   SAB IAB Ectopic Multiple Live Births   1 0 0 0 3      # 1 - Date: 06, Sex: Female, Weight: 3.6 kg (7 lb 15 oz), GA: None, Delivery: Vaginal, Spontaneous, Apgar1: None, Apgar5: None, Living: Living, Birth Comments: None    # 2 - Date: 09, Sex: Female, Weight: 2.608 kg (5 lb 12 oz), GA: 37w0d, Delivery: Vaginal, Spontaneous, Apgar1: None, Apgar5: None, Living: Living, Birth Comments: None    # 3 - Date: 19, Sex: Male, Weight: 3.345 kg (7 lb 6 oz), GA: None, Delivery: Vaginal, Spontaneous, Apgar1: None, Apgar5: None, Living: Living, Birth Comments: None    # 4 - Date: 23, Sex: Unknown, Weight: None, GA: None, Delivery: Spontaneous , Apgar1: None, Apgar5: None, Living: Fetal Demise, Birth Comments: Suction D&C    # 5 - Date: None, Sex: None, Weight: None, GA: None, Delivery: None, Apgar1: None, Apgar5: None, Living: None, Birth Comments: None        Current Outpatient Medications:     prenatal vit no.124/iron/folic (PRENATAL VITAMIN ORAL), Take 1 tablet by mouth once daily., Disp: , Rfl:     prochlorperazine  (COMPAZINE) 5 MG tablet, Take 1 tablet (5 mg total) by mouth every 6 (six) hours as needed (headache)., Disp: 30 tablet, Rfl: 0    ondansetron (ZOFRAN-ODT) 4 MG TbDL, 4 mg every 4 (four) hours as needed., Disp: , Rfl:     Physical Exam:  /82   Wt 100.7 kg (222 lb)   LMP  (LMP Unknown)   BMI 38.11 kg/m²     Constitutional: General appearance: healthy, well-nourished and well-developed   Psychiatric:  Orientation to time, place and person. Normal mood and affect and active, alert   Abdomen: Auscultation/Inspection/Palpation: Gravid. No tenderness or masses. Soft, nondistended   Extremities: no CCE    FHT: 150      Assessment/Plan  1. Multigravida of advanced maternal age in first trimester, h/o Sab with confirmed FT21  Educated  Keep appt [facility] 23  NIPS low risk    2. Other obesity affecting pregnancy, antepartum  Patient educated on risks and complications of obesity and pregnancy including but not limited to first trimester miscarriage recurrent miscarriages, congenital anomalies, HTN disorders, gestational diabetes, macrosomia, PTL&D, higher risk of fetal demise in-utero and higher risk of CS (and wound complication including infection breakdown) with obesity.     The [place] preventative task force placed the following recommendations in 2016 which ACOG supports regarding utilization of low dose 81mg ASA starting at 14 weeks and continuing through pregnancy in high risk pregnant women. The utilization of this has been shown to reduce the risk for preeclampsia by 24% in clinical trials and reduce the risk of  birth by 14% and IUGR about 20%.      Begin 81mg ASA  Rx: ASA 81 mg    3. Rh negative, antepartum  Rhogam @28wks    4. 13 weeks gestation of pregnancy  Instructed on weight gain, healthy exercise, vitamins, avoidance of drugs tobacco and alcohol. Pain, fever, bleeding precautions.        Discussed with patient travel precautions.       Discussed with patient on the uncertainty of COVID 19 in  relation to pregnancy complications with patient and unborn fetus.  Advised to CDC guidelines of social distancing, patient to stay home as much as possible, and limit contact with others.  Travel restrictions discussed.  Patient to call office if she has a temperature over 100.4 cough shortness of breath or GI symptoms. Patient educated if she feels she is in an emergency to call 911.        Educated patient it is strongly advised by American Congress of Obstetrics and Gynecology as well as Society for Maternal Fetal Medicine Specialists to receive the COVID 19 vaccine. Educated patient that pregnant patients are at high risk of severe COVID 19 infections, including but not limited to death.     RTC 4 weeks

## 2023-11-14 ENCOUNTER — ROUTINE PRENATAL (OUTPATIENT)
Dept: OBSTETRICS AND GYNECOLOGY | Facility: CLINIC | Age: 38
End: 2023-11-14
Payer: COMMERCIAL

## 2023-11-14 VITALS — BODY MASS INDEX: 38.11 KG/M2 | SYSTOLIC BLOOD PRESSURE: 126 MMHG | DIASTOLIC BLOOD PRESSURE: 82 MMHG | WEIGHT: 222 LBS

## 2023-11-14 DIAGNOSIS — Z67.91 RH NEGATIVE, ANTEPARTUM: ICD-10-CM

## 2023-11-14 DIAGNOSIS — O99.210 OTHER OBESITY AFFECTING PREGNANCY, ANTEPARTUM: ICD-10-CM

## 2023-11-14 DIAGNOSIS — Z3A.13 13 WEEKS GESTATION OF PREGNANCY: ICD-10-CM

## 2023-11-14 DIAGNOSIS — O09.521 MULTIGRAVIDA OF ADVANCED MATERNAL AGE IN FIRST TRIMESTER: ICD-10-CM

## 2023-11-14 DIAGNOSIS — O26.899 RH NEGATIVE, ANTEPARTUM: ICD-10-CM

## 2023-11-14 DIAGNOSIS — E66.8 OTHER OBESITY AFFECTING PREGNANCY, ANTEPARTUM: ICD-10-CM

## 2023-11-14 PROBLEM — O02.1 MISSED AB: Status: RESOLVED | Noted: 2023-07-11 | Resolved: 2023-11-14

## 2023-11-14 PROCEDURE — 0502F PR SUBSEQUENT PRENATAL CARE: ICD-10-PCS | Mod: CPTII,,, | Performed by: OBSTETRICS & GYNECOLOGY

## 2023-11-14 PROCEDURE — 0502F SUBSEQUENT PRENATAL CARE: CPT | Mod: CPTII,,, | Performed by: OBSTETRICS & GYNECOLOGY

## 2023-11-14 RX ORDER — ASPIRIN 81 MG/1
81 TABLET ORAL DAILY
Qty: 30 TABLET | Refills: 3 | Status: SHIPPED | OUTPATIENT
Start: 2023-11-14 | End: 2024-11-13

## 2023-11-27 ENCOUNTER — OFFICE VISIT (OUTPATIENT)
Dept: MATERNAL FETAL MEDICINE | Facility: CLINIC | Age: 38
End: 2023-11-27
Payer: COMMERCIAL

## 2023-11-27 ENCOUNTER — PROCEDURE VISIT (OUTPATIENT)
Dept: MATERNAL FETAL MEDICINE | Facility: CLINIC | Age: 38
End: 2023-11-27
Payer: COMMERCIAL

## 2023-11-27 VITALS
HEIGHT: 64 IN | WEIGHT: 225.19 LBS | SYSTOLIC BLOOD PRESSURE: 123 MMHG | BODY MASS INDEX: 38.44 KG/M2 | HEART RATE: 79 BPM | DIASTOLIC BLOOD PRESSURE: 80 MMHG

## 2023-11-27 DIAGNOSIS — O09.529 AMA (ADVANCED MATERNAL AGE) MULTIGRAVIDA 35+: ICD-10-CM

## 2023-11-27 DIAGNOSIS — O09.90 AT HIGH RISK FOR COMPLICATIONS OF INTRAUTERINE PREGNANCY (IUP): Primary | ICD-10-CM

## 2023-11-27 DIAGNOSIS — O09.521 MULTIGRAVIDA OF ADVANCED MATERNAL AGE IN FIRST TRIMESTER: ICD-10-CM

## 2023-11-27 DIAGNOSIS — Z82.79 FAMILY HISTORY OF DOWN SYNDROME: ICD-10-CM

## 2023-11-27 DIAGNOSIS — E66.01 MORBID OBESITY: ICD-10-CM

## 2023-11-27 DIAGNOSIS — O99.212 OBESITY AFFECTING PREGNANCY IN SECOND TRIMESTER, UNSPECIFIED OBESITY TYPE: ICD-10-CM

## 2023-11-27 PROCEDURE — 3074F PR MOST RECENT SYSTOLIC BLOOD PRESSURE < 130 MM HG: ICD-10-PCS | Mod: CPTII,S$GLB,, | Performed by: OBSTETRICS & GYNECOLOGY

## 2023-11-27 PROCEDURE — 99203 PR OFFICE/OUTPT VISIT, NEW, LEVL III, 30-44 MIN: ICD-10-PCS | Mod: S$GLB,,, | Performed by: OBSTETRICS & GYNECOLOGY

## 2023-11-27 PROCEDURE — 76805 PR US, OB 14+WKS, TRANSABD, SINGLE GESTATION: ICD-10-PCS | Mod: S$GLB,,, | Performed by: OBSTETRICS & GYNECOLOGY

## 2023-11-27 PROCEDURE — 99203 OFFICE O/P NEW LOW 30 MIN: CPT | Mod: S$GLB,,, | Performed by: OBSTETRICS & GYNECOLOGY

## 2023-11-27 PROCEDURE — 3044F PR MOST RECENT HEMOGLOBIN A1C LEVEL <7.0%: ICD-10-PCS | Mod: CPTII,S$GLB,, | Performed by: OBSTETRICS & GYNECOLOGY

## 2023-11-27 PROCEDURE — 3008F PR BODY MASS INDEX (BMI) DOCUMENTED: ICD-10-PCS | Mod: CPTII,S$GLB,, | Performed by: OBSTETRICS & GYNECOLOGY

## 2023-11-27 PROCEDURE — 3074F SYST BP LT 130 MM HG: CPT | Mod: CPTII,S$GLB,, | Performed by: OBSTETRICS & GYNECOLOGY

## 2023-11-27 PROCEDURE — 1159F MED LIST DOCD IN RCRD: CPT | Mod: CPTII,S$GLB,, | Performed by: OBSTETRICS & GYNECOLOGY

## 2023-11-27 PROCEDURE — 3008F BODY MASS INDEX DOCD: CPT | Mod: CPTII,S$GLB,, | Performed by: OBSTETRICS & GYNECOLOGY

## 2023-11-27 PROCEDURE — 1159F PR MEDICATION LIST DOCUMENTED IN MEDICAL RECORD: ICD-10-PCS | Mod: CPTII,S$GLB,, | Performed by: OBSTETRICS & GYNECOLOGY

## 2023-11-27 PROCEDURE — 3079F DIAST BP 80-89 MM HG: CPT | Mod: CPTII,S$GLB,, | Performed by: OBSTETRICS & GYNECOLOGY

## 2023-11-27 PROCEDURE — 76805 OB US >/= 14 WKS SNGL FETUS: CPT | Mod: S$GLB,,, | Performed by: OBSTETRICS & GYNECOLOGY

## 2023-11-27 PROCEDURE — 3079F PR MOST RECENT DIASTOLIC BLOOD PRESSURE 80-89 MM HG: ICD-10-PCS | Mod: CPTII,S$GLB,, | Performed by: OBSTETRICS & GYNECOLOGY

## 2023-11-27 PROCEDURE — 3044F HG A1C LEVEL LT 7.0%: CPT | Mod: CPTII,S$GLB,, | Performed by: OBSTETRICS & GYNECOLOGY

## 2023-11-27 NOTE — PROGRESS NOTES
Maternal Fetal Medicine Consult    Subjective     Patient ID: [id number]    Chief Complaint: MFM CONSULT W/US (AMA)      HPI 38 y.o.  female  at 15w1d gestation with Estimated Date of Delivery: 24 by early US, not consistent with LMP. She is sent for MFM consultation for AMA.  She is of advanced maternal age and will be 38 by the [facility]. She had negative cell free DNA.  She had a miscarriage in 2023 with baby confirmed having trisomy 21. She had suction D&C. She has increased BMI of 38.7 at consult visit.  Patient already had an early 1 hour glucose challenge test in October that was normal.  Patient is on daily aspirin 81 mg.  She denies any personal or family history of aneuploidy or anomalies. She denies any exposure to high fevers, viral rashes, illicit drugs or alcohol in this pregnancy.  She denies any leaking fluid, vaginal bleeding, contractions, decreased fetal movement. Denies headaches, visual disturbances, or epigastric pain.  Patient is accompanied by her  Matthew.    Pregnancy complications include:   Patient Active Problem List   Diagnosis    Rh negative status during pregnancy    AMA (advanced maternal age) multigravida 35+    h/o SAb with confirmed T21    At high risk for complications of intrauterine pregnancy (IUP)    Increased BMI affecting pregnancy in second trimester        Past Medical History:   Diagnosis Date    Abnormal Pap smear of cervix 2017    HPV, NORMAL NOW    Mental disorder 2017    ADHD AND ANXIETY, NOT CURRENTLY    Missed  2023    Rh incompatibility     A NEG       Past Surgical History:   Procedure Laterality Date    DILATION AND CURETTAGE OF UTERUS USING SUCTION N/A 2023    Procedure: DILATION AND CURETTAGE, UTERUS, USING SUCTION;  Surgeon: Yoanna Oshea MD;  Location: [facility];  Service: OB/GYN;  Laterality: N/A;    WISDOM TOOTH EXTRACTION      ALL FOUR REMOVED       Family History   Problem Relation Age of Onset    Breast cancer  "Maternal Grandmother     Hypertension Brother     Colon cancer Neg Hx     Ovarian cancer Neg Hx     Uterine cancer Neg Hx     Cervical cancer Neg Hx        Social History     Socioeconomic History    Marital status:    Tobacco Use    Smoking status: Never     Passive exposure: Never    Smokeless tobacco: Never   Substance and Sexual Activity    Alcohol use: Not Currently    Drug use: Never    Sexual activity: Yes     Partners: Male       Current Outpatient Medications   Medication Sig Dispense Refill    aspirin (ECOTRIN) 81 MG EC tablet Take 1 tablet (81 mg total) by mouth once daily. 30 tablet 3    prenatal vit no.124/iron/folic (PRENATAL VITAMIN ORAL) Take 1 tablet by mouth once daily.      prochlorperazine (COMPAZINE) 5 MG tablet Take 1 tablet (5 mg total) by mouth every 6 (six) hours as needed (headache). 30 tablet 0     No current facility-administered medications for this visit.       Review of patient's allergies indicates:  No Known Allergies    Medications:  Current Outpatient Medications   Medication Instructions    aspirin (ECOTRIN) 81 mg, Oral, Daily    prenatal vit no.124/iron/folic (PRENATAL VITAMIN ORAL) 1 tablet, Oral, Daily    prochlorperazine (COMPAZINE) 5 mg, Oral, Every 6 hours PRN       Review of Systems   12 point review of systems conducted, negative except as stated in the history of present illness. See HPI for details.      Objective     Visit Vitals  /80 (BP Location: Left arm, Patient Position: Sitting, BP Method: Large (Automatic))   Pulse 79   Ht 5' 4" (1.626 m)   Wt 102.2 kg (225 lb 3.2 oz)   LMP  (LMP Unknown)   BMI 38.66 kg/m²        Physical Exam  Vitals and nursing note reviewed.   Constitutional:       General: She is not in acute distress.     Appearance: Normal appearance.      Comments: Increased BMI   HENT:      Head: Normocephalic and atraumatic.      Nose: Nose normal. No congestion.      Mouth/Throat:      Pharynx: Oropharynx is clear.   Eyes:      General: No " scleral icterus.     Pupils: Pupils are equal, round, and reactive to light.   Cardiovascular:      Rate and Rhythm: Normal rate and regular rhythm.   Pulmonary:      Effort: No respiratory distress.      Breath sounds: Normal breath sounds. No wheezing.   Abdominal:      General: Abdomen is flat.      Palpations: Abdomen is soft.      Tenderness: There is no abdominal tenderness. There is no right CVA tenderness, left CVA tenderness or guarding.      Comments: No CVA tenderness gravid uterus.    Musculoskeletal:         General: Normal range of motion.      Cervical back: Neck supple.      Right lower leg: No edema.      Left lower leg: No edema.   Skin:     General: Skin is warm.      Findings: No bruising or rash.   Neurological:      General: No focal deficit present.      Mental Status: She is oriented to person, place, and time.      Deep Tendon Reflexes: Reflexes normal.      Comments: Normal reflexes   Psychiatric:         Mood and Affect: Mood normal.         Behavior: Behavior normal.         Thought Content: Thought content normal.         Judgment: Judgment normal.         ASSESSMENT/PLAN:     38 y.o.  female with IUP at 15w1d    Advanced maternal age   I discussed with her that the higher risk of aneuploidy related to advanced maternal age is caused by meiotic nondysjunction.  At this maternal  age, the risk of Down syndrome quoted at 1:  148 and the risk of any chromosomal problems quoted at 1:  104 .  she would not consider termination of pregnancy even if anomalies or aneuploidy is detected .. She was advised of the screening nature of the Level 2 ultrasound as well as the screening nature of a quad screen to check for the risk of aneuploidy with normal ultrasound and or quad screen testing that would lower the background risk of aneuploidy.        She was advised that the only diagnostic test at this time is genetic amniocentesis.  Benefits and risks of a genetic amniocentesis were discussed.  Patient was offered genetic amniocentesis, after thorough counseling on benefits and risks of procedure, with very remote risk of complications and in some studies no identifiable increased risk, above background risk of spontaneous miscarriage, while some current data suggests risk up to 1 in 800 with early amniocentesis prior to 24 weeks, and remote risk of need for emergency delivery with all the complications of prematurity with amniocentesis after 24 weeks. She declined amniocentesis . She is aware of the need for  evaluation.    She was counseled on Cell free DNA understanding that it is an enhanced screening test but not a diagnostic test. It assesses risk for common aneuploidies such as Trisomy 13, 18, and 21 by evaluating cell-free fetal DNA in maternal circulation with a false positive rate less than 0.5% for Trisomy 21 and less reliable for Trisomy 13 and Trisomy 18. She already did cfDNA that was negative .    The higher risk of anomalies associated with advanced maternal age was also addressed. The reassurance obtained by the normal ultrasound and the limitations of ultrasound in checking for anomalies was explained with the need for regular  evaluations.     I recommend start fetal kick counts at 24 weeks. .        Recent miscarriage with confirmed T21      Increased BMI in pregnancy  Body mass index is 38.66 kg/m².    I discussed the risk of miscarriage in first trimester, recurrent miscarriages, congenital anomalies, hypertension, diabetes,  labor and the higher risk of  section and the higher risk of fetal demise in-utero. There is also higher risk of for excessive fetal weight and large for gestational age (LGA) fetuses. Mothers with LGA fetuses are at higher risk of prolonged labor,  delivery, shoulder dystocia and birth trauma. LGA neonates are increased risk of fetal hypoxia and intrauterine death, and are at risk to develop diabetes, obesity, metabolic  syndrome, asthma and cancer later in life. She was advised of the importance of eating healthy and limiting weight gain to 11-20 lbs during the pregnancy, as optimal in this situation. I recommended low calorie, low fat diet avoiding any additional excessive weight gain. Excess weight gain would be associated with gestational hypertension, gestational diabetes and adverse  outcomes, including fetal demise in utero.    It is important to do FKC from 24 weeks till delivery.       Importance of working on losing weight after the pregnancy is over, especially before a future pregnancy was discussed. Breastfeeding may be an important tool in reducing the postpartum weight retention. Fetal risks were discussed with short term risk of fetal/ obesity and long term risk of adolescent component of metabolic syndrome.    Follow a healthy low caloric diet.        At high risk for preeclampsia  With her risk factors for preeclampsia including  BMI over 30 and maternal age 35 years or older, discussed recommendations for low dose aspirin use to decrease risks for adverse outcomes, including preeclampsia, low birth rate and  delivery. Low-dose aspirin reduced the risk for preeclampsia by 15% in clinical trials and reduced the risk for  birth by 20% and FGR by 20%, and  mortality by around 20%.  After discussing risks/benefits of its use, it was agreed to contiue asa 81 mg daily until delivery.. Also, recommend using in all future pregnancies.     Patient's questions were answered.  She is in agreement with plan of care.      No follow-ups on file.     Future Appointments   Date Time Provider Department Center   2023  8:20 AM Yoanna Oshea MD [facility]        Patient was evaluated and examined by Dr. Gillette. NA Ontiveros, helped in pre charting of part of note.    Components of this note were documented using voice recognition systems and are subject to errors not  corrected at proofreading.  Please contact the author for any clarifications.      I spent at least 25 minutes on this patient on 11/27/2023 , separate from any other billable services,  including one or more of the following activities: reviewing old records, performing appropriate history and exam, medical decision making, counseling and education, calling in prescriptions, ordering tests, ordering labs, discussing the patient with staff or another provider and creating this documentation.

## 2023-12-03 ENCOUNTER — PATIENT MESSAGE (OUTPATIENT)
Dept: OTHER | Facility: OTHER | Age: 38
End: 2023-12-03
Payer: COMMERCIAL

## 2023-12-10 ENCOUNTER — PATIENT MESSAGE (OUTPATIENT)
Dept: OTHER | Facility: OTHER | Age: 38
End: 2023-12-10
Payer: COMMERCIAL

## 2023-12-11 NOTE — PROGRESS NOTES
Chief Complaint:  Routine Prenatal Visit     History of Present Illness:  Rachael Corrales is a 38 y.o. year old  at 17w2d presents for her ob exam. She is doing well. States felt dizzy at work, checked her BP and it was elevated x1. No episodes since. Asymptomatic today. States takes her BP everyday only elevated once.       Review of Systems:  General/Constitutional: Fever denies. Headache denies.     Skin: Rash denies.   Respiratory: Cough denies. SOB denies. Wheezing denies .   Cardiovascular: Chest pain denies. Dizziness denies. Palpitations denies. Swelling in hands/feet denies.    Gastrointestinal: Abdominal pain denies. Constipation denies. Diarrhea denies. Heartburn denies. Nausea denies. Vomiting denies.  Genitourinary: Painful urination denies.   Gynecologic: Vaginal bleeding denies. Vaginal discharge Normal. Leaking amniotic fluid denies.  Contractions denies.  Psychiatric: Depressed mood denies. Suicidal thoughts denies.       Current Outpatient Medications:     aspirin (ECOTRIN) 81 MG EC tablet, Take 1 tablet (81 mg total) by mouth once daily., Disp: 30 tablet, Rfl: 3    prenatal vit no.124/iron/folic (PRENATAL VITAMIN ORAL), Take 1 tablet by mouth once daily., Disp: , Rfl:     prochlorperazine (COMPAZINE) 5 MG tablet, Take 1 tablet (5 mg total) by mouth every 6 (six) hours as needed (headache)., Disp: 30 tablet, Rfl: 0    Physical Exam:  /84   Wt 101.2 kg (223 lb)   LMP  (LMP Unknown)   BMI 38.28 kg/m²     Constitutional: General appearance: healthy, well-nourished and well-developed   Psychiatric:  Orientation to time, place and person. Normal mood and affect and active, alert   Abdomen: Auscultation/Inspection/Palpation: Gravid. No tenderness or masses. Soft, nondistended   Extremities: no CCE    FHT: 140      Assessment/Plan  Elevated blood pressure  Educated   1x episode of elevated BP at home  BP normal today  ER precautions  Repeat BP check Monday on nurse visit    2. Multigravida  of advanced maternal age in first trimester  Educated  Keep appt MFM 1/3/2024  NIPS low risk    3. Other obesity affecting pregnancy, antepartum  Patient educated on risks and complications of obesity and pregnancy including but not limited to first trimester miscarriage recurrent miscarriages, congenital anomalies, HTN disorders, gestational diabetes, macrosomia, PTL&D, higher risk of fetal demise in-utero and higher risk of CS (and wound complication including infection breakdown) with obesity.     The [place] preventative task force placed the following recommendations in 2016 which ACOG supports regarding utilization of low dose 81mg ASA starting at 14 weeks and continuing through pregnancy in high risk pregnant women. The utilization of this has been shown to reduce the risk for preeclampsia by 24% in clinical trials and reduce the risk of  birth by 14% and IUGR about 20%.      Cont 81mg ASA    4. Rh negative, antepartum  Rhogam @28wks    5. 17 weeks gestation of pregnancy  Instructed on weight gain, healthy exercise, vitamins, avoidance of drugs tobacco and alcohol. Pain, fever, bleeding precautions.        Discussed with patient travel precautions.    RTC Monday for nurse visit BP check and 4 weeks for tummy check

## 2023-12-12 ENCOUNTER — ROUTINE PRENATAL (OUTPATIENT)
Dept: OBSTETRICS AND GYNECOLOGY | Facility: CLINIC | Age: 38
End: 2023-12-12
Payer: COMMERCIAL

## 2023-12-12 VITALS — BODY MASS INDEX: 38.28 KG/M2 | SYSTOLIC BLOOD PRESSURE: 122 MMHG | WEIGHT: 223 LBS | DIASTOLIC BLOOD PRESSURE: 84 MMHG

## 2023-12-12 DIAGNOSIS — O26.899 RH NEGATIVE, ANTEPARTUM: ICD-10-CM

## 2023-12-12 DIAGNOSIS — Z67.91 RH NEGATIVE, ANTEPARTUM: ICD-10-CM

## 2023-12-12 DIAGNOSIS — E66.8 OTHER OBESITY AFFECTING PREGNANCY, ANTEPARTUM: ICD-10-CM

## 2023-12-12 DIAGNOSIS — O09.521 MULTIGRAVIDA OF ADVANCED MATERNAL AGE IN FIRST TRIMESTER: Primary | ICD-10-CM

## 2023-12-12 DIAGNOSIS — O99.210 OTHER OBESITY AFFECTING PREGNANCY, ANTEPARTUM: ICD-10-CM

## 2023-12-12 DIAGNOSIS — Z3A.17 17 WEEKS GESTATION OF PREGNANCY: ICD-10-CM

## 2023-12-12 LAB
BILIRUB SERPL-MCNC: NORMAL MG/DL
BLOOD, POC UA: NORMAL
GLUCOSE UR QL STRIP: NEGATIVE
KETONES UR QL STRIP: NORMAL
LEUKOCYTE ESTERASE URINE, POC: NEGATIVE
NITRITE, POC UA: NEGATIVE
PH, POC UA: NORMAL
PROTEIN, POC: NEGATIVE
SPECIFIC GRAVITY, POC UA: NORMAL
UROBILINOGEN, POC UA: NORMAL

## 2023-12-12 PROCEDURE — 87591 N.GONORRHOEAE DNA AMP PROB: CPT | Performed by: OBSTETRICS & GYNECOLOGY

## 2023-12-12 PROCEDURE — 0502F SUBSEQUENT PRENATAL CARE: CPT | Mod: CPTII,,, | Performed by: OBSTETRICS & GYNECOLOGY

## 2023-12-12 PROCEDURE — 87661 TRICHOMONAS VAGINALIS AMPLIF: CPT | Performed by: OBSTETRICS & GYNECOLOGY

## 2023-12-12 PROCEDURE — 0502F PR SUBSEQUENT PRENATAL CARE: ICD-10-PCS | Mod: CPTII,,, | Performed by: OBSTETRICS & GYNECOLOGY

## 2023-12-12 PROCEDURE — 87491 CHLMYD TRACH DNA AMP PROBE: CPT | Performed by: OBSTETRICS & GYNECOLOGY

## 2023-12-14 LAB
C TRACH RRNA SPEC QL NAA+PROBE: NEGATIVE
N GONORRHOEA RRNA SPEC QL NAA+PROBE: NEGATIVE
SPECIMEN SOURCE: NORMAL
T VAGINALIS RRNA SPEC QL NAA+PROBE: NEGATIVE

## 2023-12-19 ENCOUNTER — LAB VISIT (OUTPATIENT)
Dept: LAB | Facility: HOSPITAL | Age: 38
End: 2023-12-19
Attending: OBSTETRICS & GYNECOLOGY
Payer: COMMERCIAL

## 2023-12-19 ENCOUNTER — CLINICAL SUPPORT (OUTPATIENT)
Dept: OBSTETRICS AND GYNECOLOGY | Facility: CLINIC | Age: 38
End: 2023-12-19
Payer: COMMERCIAL

## 2023-12-19 VITALS
BODY MASS INDEX: 39.27 KG/M2 | SYSTOLIC BLOOD PRESSURE: 120 MMHG | HEIGHT: 64 IN | WEIGHT: 230 LBS | DIASTOLIC BLOOD PRESSURE: 78 MMHG

## 2023-12-19 DIAGNOSIS — Z3A.18 18 WEEKS GESTATION OF PREGNANCY: ICD-10-CM

## 2023-12-19 DIAGNOSIS — O16.2 ELEVATED BLOOD PRESSURE AFFECTING PREGNANCY IN SECOND TRIMESTER, ANTEPARTUM: Primary | ICD-10-CM

## 2023-12-19 DIAGNOSIS — O09.521 MULTIGRAVIDA OF ADVANCED MATERNAL AGE IN FIRST TRIMESTER: ICD-10-CM

## 2023-12-19 PROCEDURE — 36415 COLL VENOUS BLD VENIPUNCTURE: CPT

## 2023-12-19 PROCEDURE — 82105 ALPHA-FETOPROTEIN SERUM: CPT

## 2023-12-19 NOTE — NURSING
Chief Complaint:     Chief Complaint   Patient presents with    Blood Pressure Check     Pt presents for BP check.          HPI:   38 y.o.  presents for pregnancy blood pressure check. No complaints. Pt not currently on any blood pressure medications, only aspirin 81mg daily. Denies headaches, light headed, dizziness, or blurred vision.     LMP: No cycle since D&C 23  Frequency: n/a   Cycle Length: n/a   Flow: n/a  Intermenstrual Bleeding: No  Postcoital Bleeding: No  Dysmenorrhea: No  Sexually Active: yes   Dyspareunia: No  Contraception: none   H/o STI: HPV  Last pap: 3/30/23 NIL  H/o abnl pap: Yes   Colposcopy: no  Gardasil: 0/        Current Outpatient Medications:     aspirin (ECOTRIN) 81 MG EC tablet, Take 1 tablet (81 mg total) by mouth once daily., Disp: 30 tablet, Rfl: 3    prenatal vit no.124/iron/folic (PRENATAL VITAMIN ORAL), Take 1 tablet by mouth once daily., Disp: , Rfl:     prochlorperazine (COMPAZINE) 5 MG tablet, Take 1 tablet (5 mg total) by mouth every 6 (six) hours as needed (headache)., Disp: 30 tablet, Rfl: 0    Review of patient's allergies indicates:  No Known Allergies    Social History     Tobacco Use    Smoking status: Never     Passive exposure: Never    Smokeless tobacco: Never   Substance Use Topics    Alcohol use: Not Currently    Drug use: Never       Review of Systems:   General/Constitutional: Chills denies. Fatigue/weakness denies. Fever denies. Night sweats denies. Hot flashes denies    Respiratory: Cough denies. Hemoptysis denies. SOB denies. Sputum production denies. Wheezing denies .   Cardiovascular: Chest pain denies . Dizziness denies. Palpitations denies. Swelling in hands/feet denies    Gastrointestinal: Abdominal pain denies. Blood in stool denies. Constipation denies. Diarrhea denies. Heartburn denies. Nausea denies. Vomiting denies    Genitourinary: Incontinence denies. Blood in urine denies. Frequent urination denies. Painful urination denies. Urinary  "urgency denies. Nocturia denies    Gynecologic: Irregular menses denies. Heavy bleeding denies. Painful menses denies. Vaginal discharge denies. Vaginal odor denies. Vaginal itching denies. Vaginal lesion denies. Pelvic pain denies. Decreased libido denies. Vulvar lesion denies. Prolapse of genital organs denies. Painful intercourse denies. Postcoital bleeding denies    Psychiatric: Depression denies. Anxiety denies       Physical Exam:   Vitals:    12/19/23 1434 12/19/23 1440   BP: 124/78 120/78   BP Location: Right arm Right arm   Patient Position: Sitting Sitting   BP Method: Large (Manual) Large (Manual)   Weight: 104.3 kg (230 lb) 104.3 kg (230 lb)   Height: 5' 4" (1.626 m) 5' 4" (1.626 m)       Body mass index is 39.48 kg/m².    Constitutional:       Appearance: She is well-developed  Abdominal:       General: Abdomen is flat.  Neurological:        Mental Status: She is alert and oriented to person, place and time.   Psychiatric:       Attention and Perception: Attention normal.       Mood and Affect: Mood normal. Mood is not anxious or depressed.       Assessment:     Patient Active Problem List   Diagnosis    Rh negative status during pregnancy    AMA (advanced maternal age) multigravida 35+    h/o SAb with confirmed T21    At high risk for complications of intrauterine pregnancy (IUP)    Increased BMI affecting pregnancy in second trimester    BMI at 38.7 at initial [facility] consult visit       Health Maintenance Due   Topic Date Due    Lipid Panel  Never done    Influenza Vaccine (1) 09/01/2023    COVID-19 Vaccine (3 - 2023-24 season) 09/01/2023     Health Maintenance Topics with due status: Not Due       Topic Last Completion Date    TETANUS VACCINE 02/06/2019    Cervical Cancer Screening 03/30/2023    Hemoglobin A1c (Diabetic Prevention Screening) 10/16/2023           Plan:    1. Elevated blood pressure affecting pregnancy in second trimester, antepartum  Patient here for Blood Pressure check, upon arrival " B/P was 124/78  .  Let patient sit for awhile and rechecked B/P, reading was 120/78.  Pre-E precautions given.  Advised to RTC for as scheduled with Dr. Oshea.  Patient voiced understanding.      2. 18 weeks gestation of pregnancy

## 2023-12-21 ENCOUNTER — TELEPHONE (OUTPATIENT)
Dept: MATERNAL FETAL MEDICINE | Facility: CLINIC | Age: 38
End: 2023-12-21
Payer: COMMERCIAL

## 2023-12-21 LAB — MAYO GENERIC ORDERABLE RESULT: NORMAL

## 2023-12-21 NOTE — TELEPHONE ENCOUNTER
----- Message from Fátima Ortega PA-C sent at 12/21/2023  3:15 PM CST -----  Please notify patient of below:    1. MSAFP was normal    Pt notified

## 2023-12-31 ENCOUNTER — PATIENT MESSAGE (OUTPATIENT)
Dept: OTHER | Facility: OTHER | Age: 38
End: 2023-12-31
Payer: COMMERCIAL

## 2024-01-02 DIAGNOSIS — O99.212 OBESITY AFFECTING PREGNANCY IN SECOND TRIMESTER, UNSPECIFIED OBESITY TYPE: ICD-10-CM

## 2024-01-02 DIAGNOSIS — O09.529 AMA (ADVANCED MATERNAL AGE) MULTIGRAVIDA 35+: Primary | ICD-10-CM

## 2024-01-02 DIAGNOSIS — O09.521 MULTIGRAVIDA OF ADVANCED MATERNAL AGE IN FIRST TRIMESTER: ICD-10-CM

## 2024-01-03 ENCOUNTER — OFFICE VISIT (OUTPATIENT)
Dept: MATERNAL FETAL MEDICINE | Facility: CLINIC | Age: 39
End: 2024-01-03
Payer: COMMERCIAL

## 2024-01-03 ENCOUNTER — PROCEDURE VISIT (OUTPATIENT)
Dept: MATERNAL FETAL MEDICINE | Facility: CLINIC | Age: 39
End: 2024-01-03
Payer: COMMERCIAL

## 2024-01-03 VITALS
HEIGHT: 64 IN | WEIGHT: 230.81 LBS | DIASTOLIC BLOOD PRESSURE: 80 MMHG | SYSTOLIC BLOOD PRESSURE: 112 MMHG | BODY MASS INDEX: 39.4 KG/M2 | HEART RATE: 83 BPM

## 2024-01-03 DIAGNOSIS — O09.521 MULTIGRAVIDA OF ADVANCED MATERNAL AGE IN FIRST TRIMESTER: ICD-10-CM

## 2024-01-03 DIAGNOSIS — O99.212 OBESITY AFFECTING PREGNANCY IN SECOND TRIMESTER, UNSPECIFIED OBESITY TYPE: ICD-10-CM

## 2024-01-03 DIAGNOSIS — O09.529 AMA (ADVANCED MATERNAL AGE) MULTIGRAVIDA 35+: ICD-10-CM

## 2024-01-03 DIAGNOSIS — O09.90 AT HIGH RISK FOR COMPLICATIONS OF INTRAUTERINE PREGNANCY (IUP): Primary | ICD-10-CM

## 2024-01-03 DIAGNOSIS — E66.01 MORBID OBESITY: ICD-10-CM

## 2024-01-03 DIAGNOSIS — O09.522 MULTIGRAVIDA OF ADVANCED MATERNAL AGE IN SECOND TRIMESTER: ICD-10-CM

## 2024-01-03 DIAGNOSIS — Z82.79 FAMILY HISTORY OF DOWN SYNDROME: ICD-10-CM

## 2024-01-03 PROCEDURE — 99213 OFFICE O/P EST LOW 20 MIN: CPT | Mod: S$GLB,,, | Performed by: OBSTETRICS & GYNECOLOGY

## 2024-01-03 PROCEDURE — 3079F DIAST BP 80-89 MM HG: CPT | Mod: CPTII,S$GLB,, | Performed by: OBSTETRICS & GYNECOLOGY

## 2024-01-03 PROCEDURE — 3008F BODY MASS INDEX DOCD: CPT | Mod: CPTII,S$GLB,, | Performed by: OBSTETRICS & GYNECOLOGY

## 2024-01-03 PROCEDURE — 76811 OB US DETAILED SNGL FETUS: CPT | Mod: S$GLB,,, | Performed by: OBSTETRICS & GYNECOLOGY

## 2024-01-03 PROCEDURE — 3074F SYST BP LT 130 MM HG: CPT | Mod: CPTII,S$GLB,, | Performed by: OBSTETRICS & GYNECOLOGY

## 2024-01-03 PROCEDURE — 1159F MED LIST DOCD IN RCRD: CPT | Mod: CPTII,S$GLB,, | Performed by: OBSTETRICS & GYNECOLOGY

## 2024-01-03 NOTE — PROGRESS NOTES
"  Maternal Fetal Medicine Follow Up    Subjective     Patient ID: [id number]    Chief Complaint: MFM FOLLOWUP W/US      HPI: Rachael Corrales is a 38 y.o. female  at 20w3d gestation with Estimated Date of Delivery: 24  who is here for follow  up consultation by MFM.    She is of advanced maternal age and will be 38 by the [facility]. She had negative cell free DNA and normal MSAFP.  She had a miscarriage in 2023 with baby confirmed having trisomy 21. She had suction D&C. She has increased BMI of 38.7 at consult visit.  Patient already had an early 1 hour glucose challenge test in October that was normal.  Patient is on low-dose aspirin daily.  Patient was accompanied by her        Interval history since last MFM visit: None.. She denies any leaking fluid, vaginal bleeding, contractions, decreased fetal movement. Denies headaches, visual disturbances, or epigastric pain.    Pregnancy complications include:   Patient Active Problem List   Diagnosis    Rh negative status during pregnancy    AMA (advanced maternal age) multigravida 35+    h/o SAb with confirmed T21    At high risk for complications of intrauterine pregnancy (IUP)    Increased BMI affecting pregnancy in second trimester    BMI at 38.7 at initial [facility] consult visit        No changes to medical, surgical, family, social, or obstetric history.    Medications:  Current Outpatient Medications   Medication Instructions    aspirin (ECOTRIN) 81 mg, Oral, Daily    prenatal vit no.124/iron/folic (PRENATAL VITAMIN ORAL) 1 tablet, Oral, Daily    prochlorperazine (COMPAZINE) 5 mg, Oral, Every 6 hours PRN       Review of Systems   12 point review of systems conducted, negative except as stated in the history of present illness. See HPI for details.      Objective     Visit Vitals  /80 (BP Location: Left arm, Patient Position: Sitting, BP Method: Large (Automatic))   Pulse 83   Ht 5' 4" (1.626 m)   Wt 104.7 kg (230 lb 12.8 oz)   LMP  (LMP Unknown) "   BMI 39.62 kg/m²        Physical Exam  Vitals and nursing note reviewed.   Constitutional:       Appearance: Normal appearance.      Comments: Increased BMI   HENT:      Head: Normocephalic and atraumatic.      Nose: Nose normal. No congestion.   Cardiovascular:      Rate and Rhythm: Normal rate.   Pulmonary:      Effort: Pulmonary effort is normal.   Skin:     Findings: No rash.   Neurological:      Mental Status: She is alert and oriented to person, place, and time.   Psychiatric:         Mood and Affect: Mood normal.         Behavior: Behavior normal.         Thought Content: Thought content normal.         Judgment: Judgment normal.           ASSESSMENT/PLAN:     38 y.o.  female with IUP at 20w3d    Advanced maternal age     With normal fetal growth and no anomalies seen today with normal amniotic fluid volume.  Limitations of ultrasound reviewed.      Reviewed risks with AMA, including risks for PTL, FGR, anomalies not seen, aneuploidy and stillbirth at term. She previously declined amniocentesis . She is aware of need for  evaluation. She previously already had a negative cell free DNA and negative MSAFP.    She was advised to do fetal kick counts after 24 weeks and continue till delivery in view of the higher risk of fetal demise in utero closer to term with advanced maternal age.       Recent miscarriage with confirmed T21  Previously discussed potential risk of recurrence. She had negative cell free DNA and declined amniocentesis.  She is aware of the need for routine  evaluation.      Increased BMI in pregnancy  Body mass index is 39.62 kg/m².  With relatively excessive weight gain of 18 lb since initial prenatal visit on 2023., she was advised to follow healthy and low caloric diet, and limit additional weight gain to no more than 1-2 lb a month.  Excess weight gain would be associated with gestational hypertension, gestational diabetes and adverse  outcomes,  including fetal demise in utero.    With risk factors associated with increased BMI, she is to do fetal kick counts throughout the pregnancy (after 24 weeks).    It is important to lose weight after the pregnancy is over, especially before a future pregnancy was discussed. Breastfeeding may be an important tool in reducing the postpartum weight retention. Fetal risks were discussed with short term risk of fetal/ obesity and long term risk of adolescent component of metabolic syndrome.       At high risk for preeclampsia  With her increased risk for preeclampsia, she agreed to continue asa 81 mg daily until delivery.. Preeclampsia precautions reviewed.     Follow up in about 12 weeks (around 3/27/2024) for MFM follow-up, Repeat ultrasound.     Future Appointments   Date Time Provider Department Center   2024 10:20 AM Yoanna Oshea MD [facility] [place] [facility]   3/27/2024 10:00 AM James Gillette MD [facility]   3/27/2024 10:00 AM ROOM 1 AND 2, [facility]        LUCIO involvement: Patient was evaluated and examined by Dr. Gillette. NA Ontiveros, helped in pre charting of part of note.    This note was created with the assistance of [employer] voice recognition software. There may be transcription errors as a result of using this technology, however minimal. Effort has been made to ensure accuracy of transcription, but any obvious errors or omissions should be clarified with the author of the document.

## 2024-01-05 NOTE — PROGRESS NOTES
Chief Complaint:  Routine Prenatal Visit    History of Present Illness:  Rachael Corrales is a 38 y.o. year old  at 21w2d presents for her ob exam. She is doing well. No complaints today.       Review of Systems:  General/Constitutional: Fever denies. Headache denies.     Skin: Rash denies.   Respiratory: Cough denies. SOB denies. Wheezing denies .   Cardiovascular: Chest pain denies. Dizziness denies. Palpitations denies. Swelling in hands/feet denies.    Gastrointestinal: Abdominal pain denies. Constipation denies. Diarrhea denies. Heartburn denies. Nausea denies. Vomiting denies.  Genitourinary: Painful urination denies.   Gynecologic: Vaginal bleeding denies. Vaginal discharge Normal. Leaking amniotic fluid denies.  Contractions denies.  Psychiatric: Depressed mood denies. Suicidal thoughts denies.       Current Outpatient Medications:     aspirin (ECOTRIN) 81 MG EC tablet, Take 1 tablet (81 mg total) by mouth once daily., Disp: 30 tablet, Rfl: 3    prenatal vit no.124/iron/folic (PRENATAL VITAMIN ORAL), Take 1 tablet by mouth once daily., Disp: , Rfl:     prochlorperazine (COMPAZINE) 5 MG tablet, Take 1 tablet (5 mg total) by mouth every 6 (six) hours as needed (headache)., Disp: 30 tablet, Rfl: 0    Physical Exam:  /82   Wt 104.4 kg (230 lb 3.2 oz)   LMP  (LMP Unknown)   BMI 39.51 kg/m²     Constitutional: General appearance: healthy, well-nourished and well-developed   Psychiatric:  Orientation to time, place and person. Normal mood and affect and active, alert   Abdomen: Auscultation/Inspection/Palpation: Gravid. No tenderness or masses. Soft, nondistended   Extremities: no CCE    FHT: 150      Assessment/Plan  1. Multigravida of advanced maternal age in first trimester  Educated  Keep appt [facility] 3/27/2024  NIPS low risk     2. Other obesity affecting pregnancy, antepartum  Patient educated on risks and complications of obesity and pregnancy including but not limited to first trimester  miscarriage recurrent miscarriages, congenital anomalies, HTN disorders, gestational diabetes, macrosomia, PTL&D, higher risk of fetal demise in-utero and higher risk of CS (and wound complication including infection breakdown) with obesity.     The [place] preventative task force placed the following recommendations in 2016 which ACOG supports regarding utilization of low dose 81mg ASA starting at 14 weeks and continuing through pregnancy in high risk pregnant women. The utilization of this has been shown to reduce the risk for preeclampsia by 24% in clinical trials and reduce the risk of  birth by 14% and IUGR about 20%.      Cont 81mg ASA     3. Rh negative, antepartum  Rhogam @28wks     4. 21 weeks gestation of pregnancy  Instructed on weight gain, healthy exercise, vitamins, avoidance of drugs tobacco and alcohol. Pain, fever, bleeding precautions.        Discussed with patient travel precautions.    RTC 3 weeks

## 2024-01-09 ENCOUNTER — ROUTINE PRENATAL (OUTPATIENT)
Dept: OBSTETRICS AND GYNECOLOGY | Facility: CLINIC | Age: 39
End: 2024-01-09
Payer: COMMERCIAL

## 2024-01-09 VITALS
DIASTOLIC BLOOD PRESSURE: 82 MMHG | BODY MASS INDEX: 39.51 KG/M2 | WEIGHT: 230.19 LBS | SYSTOLIC BLOOD PRESSURE: 122 MMHG

## 2024-01-09 DIAGNOSIS — Z67.91 RH NEGATIVE, ANTEPARTUM: ICD-10-CM

## 2024-01-09 DIAGNOSIS — Z3A.21 21 WEEKS GESTATION OF PREGNANCY: ICD-10-CM

## 2024-01-09 DIAGNOSIS — E66.8 OTHER OBESITY AFFECTING PREGNANCY, ANTEPARTUM: ICD-10-CM

## 2024-01-09 DIAGNOSIS — O09.521 MULTIGRAVIDA OF ADVANCED MATERNAL AGE IN FIRST TRIMESTER: Primary | ICD-10-CM

## 2024-01-09 DIAGNOSIS — O26.899 RH NEGATIVE, ANTEPARTUM: ICD-10-CM

## 2024-01-09 DIAGNOSIS — O99.210 OTHER OBESITY AFFECTING PREGNANCY, ANTEPARTUM: ICD-10-CM

## 2024-01-09 PROCEDURE — 0502F SUBSEQUENT PRENATAL CARE: CPT | Mod: CPTII,,, | Performed by: OBSTETRICS & GYNECOLOGY

## 2024-01-17 DIAGNOSIS — O26.891 PREGNANCY HEADACHE IN FIRST TRIMESTER: ICD-10-CM

## 2024-01-17 DIAGNOSIS — R51.9 PREGNANCY HEADACHE IN FIRST TRIMESTER: ICD-10-CM

## 2024-01-17 RX ORDER — PROCHLORPERAZINE MALEATE 5 MG
5 TABLET ORAL EVERY 6 HOURS PRN
Qty: 30 TABLET | Refills: 0 | Status: ON HOLD | OUTPATIENT
Start: 2024-01-17 | End: 2024-05-05 | Stop reason: HOSPADM

## 2024-01-18 ENCOUNTER — PATIENT MESSAGE (OUTPATIENT)
Dept: OBSTETRICS AND GYNECOLOGY | Facility: CLINIC | Age: 39
End: 2024-01-18
Payer: COMMERCIAL

## 2024-01-28 ENCOUNTER — PATIENT MESSAGE (OUTPATIENT)
Dept: OTHER | Facility: OTHER | Age: 39
End: 2024-01-28
Payer: COMMERCIAL

## 2024-02-05 NOTE — PROGRESS NOTES
Chief Complaint:  Routine Prenatal Visit    History of Present Illness:  Rachael Corrales is a 38 y.o. year old  at 25w2d presents for her ob exam. She is doing well. +fetal movement. Denies vaginal bleeding, vaginal discharge, LOF or contractions. Negative preeclampsia ROS.    C/o occasional palpitations. 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 Denies any current symptoms. Improved with rest and hydration. Happens mostly in the mornings. Eats limited breakfast, fruits at time, limited protein. Denies SOB      Review of Systems:  General/Constitutional: Fever denies. Headache denies.    Skin: Rash denies.   Respiratory: Cough denies. SOB denies. Wheezing denies .   Cardiovascular: Chest pain denies. Dizziness denies. Palpitations denies. Swelling in hands/feet denies.    Gastrointestinal: Abdominal pain denies. Constipation denies. Diarrhea denies. Heartburn denies. Nausea denies. Vomiting denies.    Genitourinary: Painful urination denies.   Gynecologic: Vaginal bleeding denies. Vaginal discharge denies. Leaking amniotic fluid denies. Contractions denies.    Psychiatric: Depressed mood denies. Suicidal thoughts denies.       Current Outpatient Medications:     aspirin (ECOTRIN) 81 MG EC tablet, Take 1 tablet (81 mg total) by mouth once daily., Disp: 30 tablet, Rfl: 3    prenatal vit no.124/iron/folic (PRENATAL VITAMIN ORAL), Take 1 tablet by mouth once daily., Disp: , Rfl:     prochlorperazine (COMPAZINE) 5 MG tablet, Take 1 tablet (5 mg total) by mouth every 6 (six) hours as needed (headache)., Disp: 30 tablet, Rfl: 0      Physical Exam:  /72   Wt 106.5 kg (234 lb 12.8 oz)   LMP  (LMP Unknown)   BMI 40.30 kg/m²  HR 83    Constitutional: General appearance: healthy, well-nourished and well-developed   Psychiatric:  Orientation to time, place and person. Normal mood and affect and active, alert   Abdomen: Auscultation/Inspection/Palpation: Gravid. No tenderness  or masses. Soft, nondistended   Extremities: no CCE  CV: RRR no MGR  Resp CTA B no WRR     FH: 25cm  FHT: 150      Assessment/Plan  1. Heart palpitations  Educated  Asymptomatic today  HR 83  labs  [facility] referral  Educated on importance of hydration and well balanced meals  ER prec    2. Multigravida of advanced maternal age in first trimester  Educated  Reviewed MFM note w/ pt  Keep appt MFM 3/27/2024  NIPS low risk     3. Other obesity affecting pregnancy, antepartum  Patient educated on risks and complications of obesity and pregnancy including but not limited to first trimester miscarriage recurrent miscarriages, congenital anomalies, HTN disorders, gestational diabetes, macrosomia, PTL&D, higher risk of fetal demise in-utero and higher risk of CS (and wound complication including infection breakdown) with obesity.     The [place] preventative task force placed the following recommendations in 2016 which ACOG supports regarding utilization of low dose 81mg ASA starting at 14 weeks and continuing through pregnancy in high risk pregnant women. The utilization of this has been shown to reduce the risk for preeclampsia by 24% in clinical trials and reduce the risk of  birth by 14% and IUGR about 20%.      Cont 81mg ASA     4. Rh negative, antepartum  Rhogam @28wks, on RTC     5. 25 weeks gestation of pregnancy  Discussed that should any of the following symptoms occur during pregnancy, patient should contact the office immediately or go to the ER after business hours: spotting or bleeding, cramping, painful urination, severe vomiting, pain in one leg or calf, sudden gush of fluid, decrease or absence of fetal movement, sudden weight gain, periorbital or facial swelling, headache with visual changes and/or photophobia.       Discussed with patient travel precautions.    RTC 3 weeks

## 2024-02-06 ENCOUNTER — ROUTINE PRENATAL (OUTPATIENT)
Dept: OBSTETRICS AND GYNECOLOGY | Facility: CLINIC | Age: 39
End: 2024-02-06
Payer: COMMERCIAL

## 2024-02-06 ENCOUNTER — LAB VISIT (OUTPATIENT)
Dept: LAB | Facility: HOSPITAL | Age: 39
End: 2024-02-06
Attending: OBSTETRICS & GYNECOLOGY
Payer: COMMERCIAL

## 2024-02-06 VITALS
WEIGHT: 234.81 LBS | BODY MASS INDEX: 40.3 KG/M2 | HEART RATE: 83 BPM | DIASTOLIC BLOOD PRESSURE: 72 MMHG | SYSTOLIC BLOOD PRESSURE: 120 MMHG

## 2024-02-06 DIAGNOSIS — O26.899 RH NEGATIVE, ANTEPARTUM: ICD-10-CM

## 2024-02-06 DIAGNOSIS — O09.521 MULTIGRAVIDA OF ADVANCED MATERNAL AGE IN FIRST TRIMESTER: Primary | ICD-10-CM

## 2024-02-06 DIAGNOSIS — Z3A.25 25 WEEKS GESTATION OF PREGNANCY: ICD-10-CM

## 2024-02-06 DIAGNOSIS — Z67.91 RH NEGATIVE, ANTEPARTUM: ICD-10-CM

## 2024-02-06 DIAGNOSIS — R00.2 HEART PALPITATIONS: ICD-10-CM

## 2024-02-06 DIAGNOSIS — O99.210 OTHER OBESITY AFFECTING PREGNANCY, ANTEPARTUM: ICD-10-CM

## 2024-02-06 DIAGNOSIS — E66.8 OTHER OBESITY AFFECTING PREGNANCY, ANTEPARTUM: ICD-10-CM

## 2024-02-06 LAB
BILIRUB UR QL STRIP: NORMAL
GLUCOSE 1H P 100 G GLC PO SERPL-MCNC: 129 MG/DL
GLUCOSE UR QL STRIP: NEGATIVE
KETONES UR QL STRIP: NORMAL
LEUKOCYTE ESTERASE UR QL STRIP: NEGATIVE
PH, POC UA: NORMAL
POC BLOOD, URINE: NORMAL
POC NITRATES, URINE: NEGATIVE
PROT UR QL STRIP: NEGATIVE
SP GR UR STRIP: NORMAL (ref 1–1.03)
UROBILINOGEN UR STRIP-ACNC: NORMAL (ref 0.3–2.2)

## 2024-02-06 PROCEDURE — 82950 GLUCOSE TEST: CPT

## 2024-02-06 PROCEDURE — 36415 COLL VENOUS BLD VENIPUNCTURE: CPT

## 2024-02-06 PROCEDURE — 0502F SUBSEQUENT PRENATAL CARE: CPT | Mod: CPTII,,, | Performed by: OBSTETRICS & GYNECOLOGY

## 2024-02-11 ENCOUNTER — PATIENT MESSAGE (OUTPATIENT)
Dept: OTHER | Facility: OTHER | Age: 39
End: 2024-02-11
Payer: COMMERCIAL

## 2024-02-15 ENCOUNTER — LAB VISIT (OUTPATIENT)
Dept: LAB | Facility: HOSPITAL | Age: 39
End: 2024-02-15
Attending: OBSTETRICS & GYNECOLOGY
Payer: COMMERCIAL

## 2024-02-15 DIAGNOSIS — R00.2 PALPITATIONS: Primary | ICD-10-CM

## 2024-02-15 DIAGNOSIS — Z3A.25 25 WEEKS GESTATION OF PREGNANCY: ICD-10-CM

## 2024-02-15 DIAGNOSIS — R00.2 HEART PALPITATIONS: ICD-10-CM

## 2024-02-15 LAB
ALBUMIN SERPL-MCNC: 3.5 G/DL (ref 3.4–5)
ALBUMIN/GLOB SERPL: 1.1 RATIO
ALP SERPL-CCNC: 63 UNIT/L (ref 50–144)
ALT SERPL-CCNC: 13 UNIT/L (ref 1–45)
ANION GAP SERPL CALC-SCNC: 7 MEQ/L (ref 2–13)
AST SERPL-CCNC: 22 UNIT/L (ref 14–36)
BASOPHILS # BLD AUTO: 0.03 X10(3)/MCL (ref 0.01–0.08)
BASOPHILS NFR BLD AUTO: 0.3 % (ref 0.1–1.2)
BILIRUB SERPL-MCNC: 0.3 MG/DL (ref 0–1)
BUN SERPL-MCNC: 6 MG/DL (ref 7–20)
CALCIUM SERPL-MCNC: 9 MG/DL (ref 8.4–10.2)
CHLORIDE SERPL-SCNC: 106 MMOL/L (ref 98–110)
CO2 SERPL-SCNC: 21 MMOL/L (ref 21–32)
CREAT SERPL-MCNC: 0.39 MG/DL (ref 0.66–1.25)
CREAT/UREA NIT SERPL: 15 (ref 12–20)
EOSINOPHIL # BLD AUTO: 0.08 X10(3)/MCL (ref 0.04–0.36)
EOSINOPHIL NFR BLD AUTO: 0.7 % (ref 0.7–7)
ERYTHROCYTE [DISTWIDTH] IN BLOOD BY AUTOMATED COUNT: 12.6 % (ref 11–14.5)
GFR SERPLBLD CREATININE-BSD FMLA CKD-EPI: >90 MLS/MIN/1.73/M2
GLOBULIN SER-MCNC: 3.3 GM/DL (ref 2–3.9)
GLUCOSE SERPL-MCNC: 93 MG/DL (ref 70–115)
HCT VFR BLD AUTO: 34.4 % (ref 36–48)
HGB BLD-MCNC: 12 G/DL (ref 11.8–16)
IMM GRANULOCYTES # BLD AUTO: 0.07 X10(3)/MCL (ref 0–0.03)
IMM GRANULOCYTES NFR BLD AUTO: 0.6 % (ref 0–0.5)
LYMPHOCYTES # BLD AUTO: 2.08 X10(3)/MCL (ref 1.16–3.74)
LYMPHOCYTES NFR BLD AUTO: 17.8 % (ref 20–55)
MAGNESIUM SERPL-MCNC: 1.7 MG/DL (ref 1.8–2.4)
MCH RBC QN AUTO: 29.7 PG (ref 27–34)
MCHC RBC AUTO-ENTMCNC: 34.9 G/DL (ref 31–37)
MCV RBC AUTO: 85.1 FL (ref 79–99)
MONOCYTES # BLD AUTO: 0.55 X10(3)/MCL (ref 0.24–0.36)
MONOCYTES NFR BLD AUTO: 4.7 % (ref 4.7–12.5)
NEUTROPHILS # BLD AUTO: 8.86 X10(3)/MCL (ref 1.56–6.13)
NEUTROPHILS NFR BLD AUTO: 75.9 % (ref 37–73)
NRBC BLD AUTO-RTO: 0 %
PLATELET # BLD AUTO: 271 X10(3)/MCL (ref 140–371)
PMV BLD AUTO: 10.2 FL (ref 9.4–12.4)
POTASSIUM SERPL-SCNC: 4.2 MMOL/L (ref 3.5–5.1)
PROT SERPL-MCNC: 6.8 GM/DL (ref 6.3–8.2)
RBC # BLD AUTO: 4.04 X10(6)/MCL (ref 4–5.1)
SODIUM SERPL-SCNC: 134 MMOL/L (ref 135–145)
T PALLIDUM AB SER QL: NONREACTIVE
TSH SERPL-ACNC: 0.61 UIU/ML (ref 0.36–3.74)
WBC # SPEC AUTO: 11.67 X10(3)/MCL (ref 4–11.5)

## 2024-02-15 PROCEDURE — 80053 COMPREHEN METABOLIC PANEL: CPT

## 2024-02-15 PROCEDURE — 84443 ASSAY THYROID STIM HORMONE: CPT

## 2024-02-15 PROCEDURE — 83735 ASSAY OF MAGNESIUM: CPT

## 2024-02-15 PROCEDURE — 85025 COMPLETE CBC W/AUTO DIFF WBC: CPT

## 2024-02-15 PROCEDURE — 36415 COLL VENOUS BLD VENIPUNCTURE: CPT

## 2024-02-15 PROCEDURE — 86780 TREPONEMA PALLIDUM: CPT

## 2024-02-20 ENCOUNTER — ROUTINE PRENATAL (OUTPATIENT)
Dept: OBSTETRICS AND GYNECOLOGY | Facility: CLINIC | Age: 39
End: 2024-02-20
Payer: COMMERCIAL

## 2024-02-20 VITALS
WEIGHT: 236.38 LBS | SYSTOLIC BLOOD PRESSURE: 122 MMHG | HEIGHT: 64 IN | TEMPERATURE: 98 F | BODY MASS INDEX: 40.36 KG/M2 | DIASTOLIC BLOOD PRESSURE: 74 MMHG

## 2024-02-20 DIAGNOSIS — Z3A.27 27 WEEKS GESTATION OF PREGNANCY: ICD-10-CM

## 2024-02-20 DIAGNOSIS — E66.8 OTHER OBESITY AFFECTING PREGNANCY, ANTEPARTUM: ICD-10-CM

## 2024-02-20 DIAGNOSIS — O99.210 OTHER OBESITY AFFECTING PREGNANCY, ANTEPARTUM: ICD-10-CM

## 2024-02-20 DIAGNOSIS — Z67.91 RH NEGATIVE, ANTEPARTUM: ICD-10-CM

## 2024-02-20 DIAGNOSIS — O26.899 RH NEGATIVE, ANTEPARTUM: ICD-10-CM

## 2024-02-20 DIAGNOSIS — O09.521 MULTIGRAVIDA OF ADVANCED MATERNAL AGE IN FIRST TRIMESTER: ICD-10-CM

## 2024-02-20 DIAGNOSIS — R00.2 HEART PALPITATIONS: Primary | ICD-10-CM

## 2024-02-20 PROCEDURE — 90715 TDAP VACCINE 7 YRS/> IM: CPT | Mod: ,,, | Performed by: OBSTETRICS & GYNECOLOGY

## 2024-02-20 PROCEDURE — 90471 IMMUNIZATION ADMIN: CPT | Mod: ,,, | Performed by: OBSTETRICS & GYNECOLOGY

## 2024-02-20 PROCEDURE — 0502F SUBSEQUENT PRENATAL CARE: CPT | Mod: CPTII,,, | Performed by: OBSTETRICS & GYNECOLOGY

## 2024-02-20 NOTE — PROGRESS NOTES
"Chief Complaint:  Routine Prenatal Visit    History of Present Illness:  Rachael Corrales is a 38 y.o. year old  at 27w2d presents for her ob exam. She is doing well. +fetal movement. Denies vaginal bleeding, vaginal discharge, LOF or contractions. Negative preeclampsia ROS.      Currently wearing cardiac Holter. No sx in clinic today      Review of Systems:  General/Constitutional: Fever denies. Headache denies.    Skin: Rash denies.   Respiratory: Cough denies. SOB denies. Wheezing denies .   Cardiovascular: Chest pain denies. Dizziness denies. Palpitations denies. Swelling in hands/feet denies.    Gastrointestinal: Abdominal pain denies. Constipation denies. Diarrhea denies. Heartburn denies. Nausea denies. Vomiting denies.    Genitourinary: Painful urination denies.   Gynecologic: Vaginal bleeding denies. Vaginal discharge denies. Leaking amniotic fluid denies. Contractions denies.    Psychiatric: Depressed mood denies. Suicidal thoughts denies.       Current Outpatient Medications:     aspirin (ECOTRIN) 81 MG EC tablet, Take 1 tablet (81 mg total) by mouth once daily., Disp: 30 tablet, Rfl: 3    prenatal vit no.124/iron/folic (PRENATAL VITAMIN ORAL), Take 1 tablet by mouth once daily., Disp: , Rfl:     prochlorperazine (COMPAZINE) 5 MG tablet, Take 1 tablet (5 mg total) by mouth every 6 (six) hours as needed (headache)., Disp: 30 tablet, Rfl: 0      Physical Exam:  /74 (BP Location: Right arm, Patient Position: Sitting)   Temp 97.7 °F (36.5 °C)   Ht 5' 4" (1.626 m)   Wt 107.2 kg (236 lb 6.4 oz)   LMP  (LMP Unknown)   BMI 40.58 kg/m²     Constitutional: General appearance: healthy, well-nourished and well-developed   Psychiatric:  Orientation to time, place and person. Normal mood and affect and active, alert   Abdomen: Auscultation/Inspection/Palpation: Gravid. No tenderness or masses. Soft, nondistended   Extremities: no CCE       FH: 27cm  FHT: 140      Assessment/Plan  1. Heart " palpitations  Educated  Asymptomatic today  Reviewed [facility] note w/ pt  Currently with holter, keep [facility] f/u 24  Educated on importance of hydration and well balanced meals  ER prec    2. Multigravida of advanced maternal age in first trimester  Educated  Reviewed MFM note w/ pt  Keep appt MFM 3/27/2024  NIPS low risk     3. Other obesity affecting pregnancy, antepartum  Patient educated on risks and complications of obesity and pregnancy including but not limited to first trimester miscarriage recurrent miscarriages, congenital anomalies, HTN disorders, gestational diabetes, macrosomia, PTL&D, higher risk of fetal demise in-utero and higher risk of CS (and wound complication including infection breakdown) with obesity.     The [place] preventative task force placed the following recommendations in 2016 which ACOG supports regarding utilization of low dose 81mg ASA starting at 14 weeks and continuing through pregnancy in high risk pregnant women. The utilization of this has been shown to reduce the risk for preeclampsia by 24% in clinical trials and reduce the risk of  birth by 14% and IUGR about 20%.      Cont 81mg ASA     4. Rh negative, antepartum  Rhogam @28wks    5. 27 weeks gestation of pregnancy  Discussed that should any of the following symptoms occur during pregnancy, patient should contact the office immediately or go to the ER after business hours: spotting or bleeding, cramping, painful urination, severe vomiting, pain in one leg or calf, sudden gush of fluid, decrease or absence of fetal movement, sudden weight gain, periorbital or facial swelling, headache with visual changes and/or photophobia.       Discussed with patient travel precautions.    Rhogam  RTC 2 weeks

## 2024-02-25 ENCOUNTER — PATIENT MESSAGE (OUTPATIENT)
Dept: OTHER | Facility: OTHER | Age: 39
End: 2024-02-25
Payer: COMMERCIAL

## 2024-03-04 NOTE — PROGRESS NOTES
Chief Complaint:  Routine Prenatal Visit    History of Present Illness:  Rachael Corrales is a 38 y.o. year old  at 29w2d presents for her ob exam. She is doing well. +fetal movement. Denies vaginal bleeding, vaginal discharge, LOF or contractions. Negative preeclampsia ROS.    Saw [facility] yesterday. Started on Metoprolol secondary to tachycardia. No current Sx      Review of Systems:  General/Constitutional: Fever denies. Headache denies.    Skin: Rash denies.   Respiratory: Cough denies. SOB denies. Wheezing denies .   Cardiovascular: Chest pain denies. Dizziness denies. Palpitations denies. Swelling in hands/feet denies.    Gastrointestinal: Abdominal pain denies. Constipation denies. Diarrhea denies. Heartburn denies. Nausea denies. Vomiting denies.    Genitourinary: Painful urination denies.   Gynecologic: Vaginal bleeding denies. Vaginal discharge denies. Leaking amniotic fluid denies. Contractions denies.    Psychiatric: Depressed mood denies. Suicidal thoughts denies.       Current Outpatient Medications:     aspirin (ECOTRIN) 81 MG EC tablet, Take 1 tablet (81 mg total) by mouth once daily., Disp: 30 tablet, Rfl: 3    metoprolol tartrate (LOPRESSOR) 25 MG tablet, Take 25 mg by mouth., Disp: , Rfl:     prenatal vit no.124/iron/folic (PRENATAL VITAMIN ORAL), Take 1 tablet by mouth once daily., Disp: , Rfl:     prochlorperazine (COMPAZINE) 5 MG tablet, Take 1 tablet (5 mg total) by mouth every 6 (six) hours as needed (headache)., Disp: 30 tablet, Rfl: 0      Physical Exam:  /70   Wt 108.7 kg (239 lb 9.6 oz)   LMP  (LMP Unknown)   BMI 41.13 kg/m²     Constitutional: General appearance: healthy, well-nourished and well-developed   Psychiatric:  Orientation to time, place and person. Normal mood and affect and active, alert   Abdomen: Auscultation/Inspection/Palpation: Gravid. No tenderness or masses. Soft, nondistended   Extremities: no CCE      FH: 29cm  FHT: 140      Assessment/Plan  1. Heart  palpitations  Educated  Seen [facility] 3/4/24, started her on metoprolol secondary to tachycardia  Echo planned 3/15/24  [facility] note pending    2. Multigravida of advanced maternal age in first trimester  Educated  Reviewed MFM note w/ pt  Keep appt MFM 3/27/2024  NIPS low risk     3. Other obesity affecting pregnancy, antepartum  Patient educated on risks and complications of obesity and pregnancy including but not limited to first trimester miscarriage recurrent miscarriages, congenital anomalies, HTN disorders, gestational diabetes, macrosomia, PTL&D, higher risk of fetal demise in-utero and higher risk of CS (and wound complication including infection breakdown) with obesity.     The [place] preventative task force placed the following recommendations in 2016 which ACOG supports regarding utilization of low dose 81mg ASA starting at 14 weeks and continuing through pregnancy in high risk pregnant women. The utilization of this has been shown to reduce the risk for preeclampsia by 24% in clinical trials and reduce the risk of  birth by 14% and IUGR about 20%.      Cont 81mg ASA     4. Rh negative, antepartum  Educated on rhogam, states she will go this week    5. 29 weeks gestation of pregnancy  Discussed that should any of the following symptoms occur during pregnancy, patient should contact the office immediately or go to the ER after business hours: spotting or bleeding, cramping, painful urination, severe vomiting, pain in one leg or calf, sudden gush of fluid, decrease or absence of fetal movement, sudden weight gain, periorbital or facial swelling, headache with visual changes and/or photophobia.       Discussed with patient travel precautions.    CBC, RPR, rhogam  RTC 3 weeks      This note was transcribed by Alondra Dent MA. There may be transcription errors as a result, however minimal. Effort has been made to ensure accuracy of transcription, but any obvious errors or omissions should be clarified with the  author of the document.

## 2024-03-05 ENCOUNTER — ROUTINE PRENATAL (OUTPATIENT)
Dept: OBSTETRICS AND GYNECOLOGY | Facility: CLINIC | Age: 39
End: 2024-03-05
Payer: COMMERCIAL

## 2024-03-05 VITALS
DIASTOLIC BLOOD PRESSURE: 70 MMHG | BODY MASS INDEX: 41.13 KG/M2 | WEIGHT: 239.63 LBS | SYSTOLIC BLOOD PRESSURE: 124 MMHG

## 2024-03-05 DIAGNOSIS — O99.210 OTHER OBESITY AFFECTING PREGNANCY, ANTEPARTUM: ICD-10-CM

## 2024-03-05 DIAGNOSIS — Z67.91 RH NEGATIVE, ANTEPARTUM: ICD-10-CM

## 2024-03-05 DIAGNOSIS — Z3A.29 29 WEEKS GESTATION OF PREGNANCY: ICD-10-CM

## 2024-03-05 DIAGNOSIS — R00.2 HEART PALPITATIONS: ICD-10-CM

## 2024-03-05 DIAGNOSIS — O09.521 MULTIGRAVIDA OF ADVANCED MATERNAL AGE IN FIRST TRIMESTER: ICD-10-CM

## 2024-03-05 DIAGNOSIS — E66.8 OTHER OBESITY AFFECTING PREGNANCY, ANTEPARTUM: ICD-10-CM

## 2024-03-05 DIAGNOSIS — O26.899 RH NEGATIVE, ANTEPARTUM: ICD-10-CM

## 2024-03-05 PROCEDURE — 0502F SUBSEQUENT PRENATAL CARE: CPT | Mod: CPTII,,, | Performed by: OBSTETRICS & GYNECOLOGY

## 2024-03-05 RX ORDER — METOPROLOL TARTRATE 25 MG/1
25 TABLET, FILM COATED ORAL
Status: ON HOLD | COMMUNITY
Start: 2024-03-04 | End: 2024-05-05 | Stop reason: HOSPADM

## 2024-03-08 NOTE — PROGRESS NOTES
"Chief Complaint:  Routine Prenatal Visit and Non-stress Test (AMA)    History of Present Illness:  Rachael Corrales is a 38 y.o. year old  at 30w2d presents for her ob exam. She is doing well. Denies vaginal bleeding, vaginal discharge, LOF or contractions. Negative preeclampsia ROS. +decreased fetal movement.     Started Metoprolol 25 mg, heart palpitations at night resolved. Mild palpitations at work.  No Sx today.  Future [facility] appt 3/15/24.      Review of Systems:  General/Constitutional: Fever denies. Headache denies.    Skin: Rash denies.   Respiratory: Cough denies. SOB denies. Wheezing denies .   Cardiovascular: Chest pain denies. Dizziness denies. Palpitations denies. Swelling in hands/feet denies.    Gastrointestinal: Abdominal pain denies. Constipation denies. Diarrhea denies. Heartburn denies. Nausea denies. Vomiting denies.    Genitourinary: Painful urination denies.   Gynecologic: Vaginal bleeding denies. Vaginal discharge denies. Leaking amniotic fluid denies. Contractions denies.    Psychiatric: Depressed mood denies. Suicidal thoughts denies.       Current Outpatient Medications:     aspirin (ECOTRIN) 81 MG EC tablet, Take 1 tablet (81 mg total) by mouth once daily., Disp: 30 tablet, Rfl: 3    metoprolol tartrate (LOPRESSOR) 25 MG tablet, Take 25 mg by mouth., Disp: , Rfl:     prenatal vit no.124/iron/folic (PRENATAL VITAMIN ORAL), Take 1 tablet by mouth once daily., Disp: , Rfl:     prochlorperazine (COMPAZINE) 5 MG tablet, Take 1 tablet (5 mg total) by mouth every 6 (six) hours as needed (headache)., Disp: 30 tablet, Rfl: 0      Physical Exam:  /72 (BP Location: Left arm, Patient Position: Sitting, BP Method: Large (Manual))   Temp 97.7 °F (36.5 °C) (Temporal)   Ht 5' 4" (1.626 m)   Wt 107 kg (236 lb)   LMP  (LMP Unknown)   BMI 40.51 kg/m²     Constitutional: General appearance: healthy, well-nourished and well-developed   Psychiatric:  Orientation to time, place and person. Normal " mood and affect and active, alert   Abdomen: Auscultation/Inspection/Palpation: Gravid. No tenderness or masses. Soft, nondistended   Extremities: no CCE      NST:   - Baseline: 130s  - Variability: moderate  - Accelerations: PRESENT: 15X15   - Decelerations: ABSENT  - Time on monitor: 20 minutes  - Category: .reactive, category 1    [place]:   - CTX: ABSENT        Assessment/Plan  1. Decreased fetal movement  Educated    Biophysical Profile: 3/12/24  2/2 - Breathing  2/2 - Tone  2/2 - Movement  2/2 - Fluid    Score: 10/10    2. Heart palpitations  Educated  Started metoprolol, nightly palpitations resolved. However, mild palpitations at work     Keep [facility] apt with ECHO this Friday     ER prec     3. Multigravida of advanced maternal age in first trimester  Educated  Reviewed MFM note w/ pt  Keep appt MFM 3/27/2024  NIPS low risk     4. Other obesity affecting pregnancy, antepartum  Patient educated on risks and complications of obesity and pregnancy including but not limited to first trimester miscarriage recurrent miscarriages, congenital anomalies, HTN disorders, gestational diabetes, macrosomia, PTL&D, higher risk of fetal demise in-utero and higher risk of CS (and wound complication including infection breakdown) with obesity.     The [place] preventative task force placed the following recommendations in 2016 which ACOG supports regarding utilization of low dose 81mg ASA starting at 14 weeks and continuing through pregnancy in high risk pregnant women. The utilization of this has been shown to reduce the risk for preeclampsia by 24% in clinical trials and reduce the risk of  birth by 14% and IUGR about 20%.      Cont 81mg ASA     5. RH negative  Educated on Rhogam    6. 30 weeks gestation of pregnancy  Discussed that should any of the following symptoms occur during pregnancy, patient should contact the office immediately or go to the ER after business hours: spotting or bleeding, cramping, painful urination,  severe vomiting, pain in one leg or calf, sudden gush of fluid, decrease or absence of fetal movement, sudden weight gain, periorbital or facial swelling, headache with visual changes and/or photophobia.       Discussed with patient travel precautions.    RTC 2 weeks      This note was transcribed by Alondra Dent MA. There may be transcription errors as a result, however minimal. Effort has been made to ensure accuracy of transcription, but any obvious errors or omissions should be clarified with the author of the document.

## 2024-03-10 ENCOUNTER — PATIENT MESSAGE (OUTPATIENT)
Dept: OTHER | Facility: OTHER | Age: 39
End: 2024-03-10
Payer: COMMERCIAL

## 2024-03-12 ENCOUNTER — CLINICAL SUPPORT (OUTPATIENT)
Dept: OBSTETRICS AND GYNECOLOGY | Facility: CLINIC | Age: 39
End: 2024-03-12
Payer: COMMERCIAL

## 2024-03-12 ENCOUNTER — INFUSION (OUTPATIENT)
Dept: INFUSION THERAPY | Facility: HOSPITAL | Age: 39
End: 2024-03-12
Attending: OBSTETRICS & GYNECOLOGY
Payer: COMMERCIAL

## 2024-03-12 ENCOUNTER — LAB VISIT (OUTPATIENT)
Dept: LAB | Facility: HOSPITAL | Age: 39
End: 2024-03-12
Attending: OBSTETRICS & GYNECOLOGY
Payer: COMMERCIAL

## 2024-03-12 VITALS
HEART RATE: 91 BPM | SYSTOLIC BLOOD PRESSURE: 118 MMHG | RESPIRATION RATE: 20 BRPM | DIASTOLIC BLOOD PRESSURE: 72 MMHG | TEMPERATURE: 98 F | OXYGEN SATURATION: 98 %

## 2024-03-12 VITALS
SYSTOLIC BLOOD PRESSURE: 110 MMHG | DIASTOLIC BLOOD PRESSURE: 72 MMHG | HEIGHT: 64 IN | BODY MASS INDEX: 40.29 KG/M2 | WEIGHT: 236 LBS | TEMPERATURE: 98 F

## 2024-03-12 DIAGNOSIS — R00.2 HEART PALPITATIONS: Primary | ICD-10-CM

## 2024-03-12 DIAGNOSIS — O99.210 OTHER OBESITY AFFECTING PREGNANCY, ANTEPARTUM: ICD-10-CM

## 2024-03-12 DIAGNOSIS — O09.521 MULTIGRAVIDA OF ADVANCED MATERNAL AGE IN FIRST TRIMESTER: ICD-10-CM

## 2024-03-12 DIAGNOSIS — Z67.91 RH NEGATIVE, ANTEPARTUM: Primary | ICD-10-CM

## 2024-03-12 DIAGNOSIS — E66.8 OTHER OBESITY AFFECTING PREGNANCY, ANTEPARTUM: ICD-10-CM

## 2024-03-12 DIAGNOSIS — Z3A.29 29 WEEKS GESTATION OF PREGNANCY: ICD-10-CM

## 2024-03-12 DIAGNOSIS — Z3A.30 30 WEEKS GESTATION OF PREGNANCY: ICD-10-CM

## 2024-03-12 DIAGNOSIS — O26.899 RH NEGATIVE, ANTEPARTUM: Primary | ICD-10-CM

## 2024-03-12 LAB
ABO AND RH: NORMAL
ANTIBODY SCREEN: NORMAL
BILIRUB UR QL STRIP: ABNORMAL
ERYTHROCYTE [DISTWIDTH] IN BLOOD BY AUTOMATED COUNT: 12.7 % (ref 11–14.5)
GLUCOSE UR QL STRIP: NEGATIVE
HCT VFR BLD AUTO: 34.8 % (ref 36–48)
HGB BLD-MCNC: 11.7 G/DL (ref 11.8–16)
KETONES UR QL STRIP: ABNORMAL
LEUKOCYTE ESTERASE UR QL STRIP: NEGATIVE
MCH RBC QN AUTO: 28.4 PG (ref 27–34)
MCHC RBC AUTO-ENTMCNC: 33.6 G/DL (ref 31–37)
MCV RBC AUTO: 84.5 FL (ref 79–99)
NRBC BLD AUTO-RTO: 0 %
PH, POC UA: ABNORMAL
PLATELET # BLD AUTO: 271 X10(3)/MCL (ref 140–371)
PMV BLD AUTO: 10.2 FL (ref 9.4–12.4)
POC BLOOD, URINE: ABNORMAL
POC NITRATES, URINE: NEGATIVE
PROT UR QL STRIP: POSITIVE
RBC # BLD AUTO: 4.12 X10(6)/MCL (ref 4–5.1)
RH IMMUNE GLOBULIN: NORMAL
SP GR UR STRIP: ABNORMAL (ref 1–1.03)
SPECIMEN OUTDATE: NORMAL
T PALLIDUM AB SER QL: NONREACTIVE
UROBILINOGEN UR STRIP-ACNC: ABNORMAL (ref 0.3–2.2)
WBC # SPEC AUTO: 12.15 X10(3)/MCL (ref 4–11.5)

## 2024-03-12 PROCEDURE — 36415 COLL VENOUS BLD VENIPUNCTURE: CPT

## 2024-03-12 PROCEDURE — 96372 THER/PROPH/DIAG INJ SC/IM: CPT

## 2024-03-12 PROCEDURE — 76818 FETAL BIOPHYS PROFILE W/NST: CPT | Mod: ,,, | Performed by: OBSTETRICS & GYNECOLOGY

## 2024-03-12 PROCEDURE — 63600519 RHOGAM PHARM REV CODE 636 ALT 250 W HCPCS: Performed by: OBSTETRICS & GYNECOLOGY

## 2024-03-12 PROCEDURE — 85027 COMPLETE CBC AUTOMATED: CPT

## 2024-03-12 PROCEDURE — 86901 BLOOD TYPING SEROLOGIC RH(D): CPT | Performed by: OBSTETRICS & GYNECOLOGY

## 2024-03-12 PROCEDURE — 0502F SUBSEQUENT PRENATAL CARE: CPT | Mod: CPTII,,, | Performed by: OBSTETRICS & GYNECOLOGY

## 2024-03-12 PROCEDURE — 86780 TREPONEMA PALLIDUM: CPT

## 2024-03-12 RX ADMIN — HUMAN RHO(D) IMMUNE GLOBULIN 300 MCG: 300 INJECTION, SOLUTION INTRAMUSCULAR at 11:03

## 2024-03-12 NOTE — PLAN OF CARE
PT IN ROOM IN STABLE CONDITION, RHOGAM GIVEN TO Left deltoid. PT TOLERATED WELL. PT STATES THAT THEY HAVE RECEIVED RHOGAM IN THE PAST WITHOUT SIGNS AND SYMPTOMS OF A REACTION. INSTRUCTED PATIENT TO CALL PCP IF NEEDED.

## 2024-03-19 ENCOUNTER — ROUTINE PRENATAL (OUTPATIENT)
Dept: OBSTETRICS AND GYNECOLOGY | Facility: CLINIC | Age: 39
End: 2024-03-19
Payer: COMMERCIAL

## 2024-03-19 VITALS
WEIGHT: 241.63 LBS | BODY MASS INDEX: 41.47 KG/M2 | SYSTOLIC BLOOD PRESSURE: 118 MMHG | DIASTOLIC BLOOD PRESSURE: 70 MMHG

## 2024-03-19 DIAGNOSIS — O99.210 OTHER OBESITY AFFECTING PREGNANCY, ANTEPARTUM: ICD-10-CM

## 2024-03-19 DIAGNOSIS — O09.521 MULTIGRAVIDA OF ADVANCED MATERNAL AGE IN FIRST TRIMESTER: Primary | ICD-10-CM

## 2024-03-19 DIAGNOSIS — E66.8 OTHER OBESITY AFFECTING PREGNANCY, ANTEPARTUM: ICD-10-CM

## 2024-03-19 DIAGNOSIS — Z67.91 RH NEGATIVE, ANTEPARTUM: ICD-10-CM

## 2024-03-19 DIAGNOSIS — O26.899 RH NEGATIVE, ANTEPARTUM: ICD-10-CM

## 2024-03-19 DIAGNOSIS — Z3A.31 31 WEEKS GESTATION OF PREGNANCY: ICD-10-CM

## 2024-03-19 PROCEDURE — 0502F SUBSEQUENT PRENATAL CARE: CPT | Mod: CPTII,,, | Performed by: OBSTETRICS & GYNECOLOGY

## 2024-03-19 NOTE — PROGRESS NOTES
Chief Complaint:  Routine Prenatal Visit    History of Present Illness:  Rachael Corrales is a 38 y.o. year old  at 31w2d presents for her ob exam. She is doing well. +fetal movement. Denies vaginal bleeding, vaginal discharge, LOF or contractions. Negative preeclampsia ROS.    No palpations.     Review of Systems:  General/Constitutional: Fever denies. Headache denies.    Skin: Rash denies.   Respiratory: Cough denies. SOB denies. Wheezing denies .   Cardiovascular: Chest pain denies. Dizziness denies. Palpitations denies. Swelling in hands/feet denies.    Gastrointestinal: Abdominal pain denies. Constipation denies. Diarrhea denies. Heartburn denies. Nausea denies. Vomiting denies.    Genitourinary: Painful urination denies.   Gynecologic: Vaginal bleeding denies. Vaginal discharge denies. Leaking amniotic fluid denies. Contractions denies.    Psychiatric: Depressed mood denies. Suicidal thoughts denies.       Current Outpatient Medications:     aspirin (ECOTRIN) 81 MG EC tablet, Take 1 tablet (81 mg total) by mouth once daily., Disp: 30 tablet, Rfl: 3    metoprolol tartrate (LOPRESSOR) 25 MG tablet, Take 25 mg by mouth., Disp: , Rfl:     prenatal vit no.124/iron/folic (PRENATAL VITAMIN ORAL), Take 1 tablet by mouth once daily., Disp: , Rfl:     prochlorperazine (COMPAZINE) 5 MG tablet, Take 1 tablet (5 mg total) by mouth every 6 (six) hours as needed (headache)., Disp: 30 tablet, Rfl: 0      Physical Exam:  /70   Wt 109.6 kg (241 lb 9.6 oz)   LMP  (LMP Unknown)   BMI 41.47 kg/m²     Constitutional: General appearance: healthy, well-nourished and well-developed   Psychiatric:  Orientation to time, place and person. Normal mood and affect and active, alert   Abdomen: Auscultation/Inspection/Palpation: Gravid. No tenderness or masses. Soft, nondistended   Extremities: no CCE       FH: 31cm  FHT: 150      Assessment/Plan  1. Heart palpitations  Educated  Started metoprolol, nightly palpitations  resolved. However, mild palpitations at work    [facility] note still pending, SIMON for ECHO  ER precautions    2. Multigravida of advanced maternal age in first trimester  Educated  Reviewed MFM note w/ pt  Keep appt MFM 3/27/2024  NIPS low risk     3. Other obesity affecting pregnancy, antepartum  Patient educated on risks and complications of obesity and pregnancy including but not limited to first trimester miscarriage recurrent miscarriages, congenital anomalies, HTN disorders, gestational diabetes, macrosomia, PTL&D, higher risk of fetal demise in-utero and higher risk of CS (and wound complication including infection breakdown) with obesity.     The [place] preventative task force placed the following recommendations in 2016 which ACOG supports regarding utilization of low dose 81mg ASA starting at 14 weeks and continuing through pregnancy in high risk pregnant women. The utilization of this has been shown to reduce the risk for preeclampsia by 24% in clinical trials and reduce the risk of  birth by 14% and IUGR about 20%.      Cont 81mg ASA     4. Rh negative, antepartum  S/p rhogam 3/12/24     5. 31 weeks gestation of pregnancy  Discussed that should any of the following symptoms occur during pregnancy, patient should contact the office immediately or go to the ER after business hours: spotting or bleeding, cramping, painful urination, severe vomiting, pain in one leg or calf, sudden gush of fluid, decrease or absence of fetal movement, sudden weight gain, periorbital or facial swelling, headache with visual changes and/or photophobia.       Discussed with patient travel precautions.    F/u  2 weeks for NST at the  and NST in-office the week after    This note was transcribed by Alondra Dent MA. There may be transcription errors as a result, however minimal. Effort has been made to ensure accuracy of transcription, but any obvious errors or omissions should be clarified with the author of the document.

## 2024-03-22 DIAGNOSIS — O09.521 MULTIGRAVIDA OF ADVANCED MATERNAL AGE IN FIRST TRIMESTER: Primary | ICD-10-CM

## 2024-03-24 ENCOUNTER — PATIENT MESSAGE (OUTPATIENT)
Dept: OTHER | Facility: OTHER | Age: 39
End: 2024-03-24
Payer: COMMERCIAL

## 2024-03-26 DIAGNOSIS — O09.521 MULTIGRAVIDA OF ADVANCED MATERNAL AGE IN FIRST TRIMESTER: Primary | ICD-10-CM

## 2024-03-27 ENCOUNTER — OFFICE VISIT (OUTPATIENT)
Dept: MATERNAL FETAL MEDICINE | Facility: CLINIC | Age: 39
End: 2024-03-27
Payer: COMMERCIAL

## 2024-03-27 ENCOUNTER — PROCEDURE VISIT (OUTPATIENT)
Dept: MATERNAL FETAL MEDICINE | Facility: CLINIC | Age: 39
End: 2024-03-27
Payer: COMMERCIAL

## 2024-03-27 VITALS
DIASTOLIC BLOOD PRESSURE: 85 MMHG | BODY MASS INDEX: 41.66 KG/M2 | SYSTOLIC BLOOD PRESSURE: 113 MMHG | WEIGHT: 244 LBS | HEART RATE: 92 BPM | HEIGHT: 64 IN

## 2024-03-27 DIAGNOSIS — Z67.91 RH NEGATIVE STATUS DURING PREGNANCY IN THIRD TRIMESTER: ICD-10-CM

## 2024-03-27 DIAGNOSIS — O09.521 MULTIGRAVIDA OF ADVANCED MATERNAL AGE IN FIRST TRIMESTER: ICD-10-CM

## 2024-03-27 DIAGNOSIS — O09.90 AT HIGH RISK FOR COMPLICATIONS OF INTRAUTERINE PREGNANCY (IUP): Primary | ICD-10-CM

## 2024-03-27 DIAGNOSIS — O26.893 RH NEGATIVE STATUS DURING PREGNANCY IN THIRD TRIMESTER: ICD-10-CM

## 2024-03-27 DIAGNOSIS — Z82.79 FAMILY HISTORY OF DOWN SYNDROME: ICD-10-CM

## 2024-03-27 DIAGNOSIS — R00.2 PALPITATIONS: ICD-10-CM

## 2024-03-27 DIAGNOSIS — O99.213 OBESITY AFFECTING PREGNANCY IN THIRD TRIMESTER, UNSPECIFIED OBESITY TYPE: ICD-10-CM

## 2024-03-27 DIAGNOSIS — O09.523 MULTIGRAVIDA OF ADVANCED MATERNAL AGE IN THIRD TRIMESTER: ICD-10-CM

## 2024-03-27 PROCEDURE — 76819 FETAL BIOPHYS PROFIL W/O NST: CPT | Mod: S$GLB,,, | Performed by: OBSTETRICS & GYNECOLOGY

## 2024-03-27 PROCEDURE — 1159F MED LIST DOCD IN RCRD: CPT | Mod: CPTII,S$GLB,, | Performed by: OBSTETRICS & GYNECOLOGY

## 2024-03-27 PROCEDURE — 3074F SYST BP LT 130 MM HG: CPT | Mod: CPTII,S$GLB,, | Performed by: OBSTETRICS & GYNECOLOGY

## 2024-03-27 PROCEDURE — 3079F DIAST BP 80-89 MM HG: CPT | Mod: CPTII,S$GLB,, | Performed by: OBSTETRICS & GYNECOLOGY

## 2024-03-27 PROCEDURE — 3008F BODY MASS INDEX DOCD: CPT | Mod: CPTII,S$GLB,, | Performed by: OBSTETRICS & GYNECOLOGY

## 2024-03-27 PROCEDURE — 1160F RVW MEDS BY RX/DR IN RCRD: CPT | Mod: CPTII,S$GLB,, | Performed by: OBSTETRICS & GYNECOLOGY

## 2024-03-27 PROCEDURE — 99213 OFFICE O/P EST LOW 20 MIN: CPT | Mod: 25,S$GLB,, | Performed by: OBSTETRICS & GYNECOLOGY

## 2024-03-27 PROCEDURE — 76816 OB US FOLLOW-UP PER FETUS: CPT | Mod: S$GLB,,, | Performed by: OBSTETRICS & GYNECOLOGY

## 2024-03-27 NOTE — PROGRESS NOTES
Maternal Fetal Medicine Follow Up    Subjective     Patient ID: [id number]    Chief Complaint: MFM follow up with US (Patient is having cough, congestion, pelvic and abdominal pain.)      HPI: Rachael Corrales is a 38 y.o. female  at 32w3d gestation with Estimated Date of Delivery: 24  who is here for follow  up consultation by MFM.    She is of advanced maternal age and will be 38 by the [facility]. She had negative cell free DNA and normal MSAFP.  She had a miscarriage in 2023 with baby confirmed having trisomy 21. She had suction D&C. She has increased BMI of 38.7 at consult visit.  Patient is on low-dose aspirin daily. She is Rh negative.  She received RhoGAM on 3/12/2024.  Patient reports she has been having some episodes of tachycardia/palpitations occasionally, mostly at work in the morning.  She has established care with [facility] and saw them on 3/4/2024.  She had EKG that showed episodes of atrial tachycardia.  She had echocardiogram and Holter monitor and patient is going later today for the result.  She stills feels the palpitations daily but it is better as she has not waking up during the night from it.. She was started on metoprolol 25 mg daily.  She reports no symptoms at this time.  TSH on 2/15/2024 was 0.614 and H&H was 12.0/34.4.  Patient is accompanied by her  Peter    Patient reported that she has history of congestion for several weeks.      Interval history since last MFM visit:  Tachycardia as above. She denies any leaking fluid, vaginal bleeding, contractions, decreased fetal movement. Denies headaches, visual disturbances, or epigastric pain.    Pregnancy complications include:   Patient Active Problem List   Diagnosis    Rh negative status during pregnancy    AMA (advanced maternal age) multigravida 35+    h/o SAb with confirmed T21    At high risk for complications of intrauterine pregnancy (IUP)    Increased BMI affecting pregnancy in third trimester    BMI at 38.7 at initial  "MFM consult visit    Palpitations        No changes to medical, surgical, family, social, or obstetric history.    Medications:  Current Outpatient Medications   Medication Instructions    aspirin (ECOTRIN) 81 mg, Oral, Daily    metoprolol tartrate (LOPRESSOR) 25 mg, Oral, 1/2 tablet BID    prenatal vit no.124/iron/folic (PRENATAL VITAMIN ORAL) 1 tablet, Oral, Daily    prochlorperazine (COMPAZINE) 5 mg, Oral, Every 6 hours PRN       Review of Systems   12 point review of systems conducted, negative except as stated in the history of present illness. See HPI for details.      Objective     Visit Vitals  /85 (BP Location: Left arm, Patient Position: Sitting, BP Method: Large (Automatic))   Pulse 92   Ht 5' 4" (1.626 m)   Wt 110.7 kg (244 lb)   LMP  (LMP Unknown)   BMI 41.88 kg/m²        Physical Exam  Vitals and nursing note reviewed.   Constitutional:       Appearance: Normal appearance.      Comments: Increased BMI   HENT:      Head: Normocephalic and atraumatic.      Nose: Nose normal. No congestion.   Cardiovascular:      Rate and Rhythm: Normal rate.   Pulmonary:      Effort: Pulmonary effort is normal.   Skin:     Findings: No rash.   Neurological:      Mental Status: She is alert and oriented to person, place, and time.   Psychiatric:         Mood and Affect: Mood normal.         Behavior: Behavior normal.         Thought Content: Thought content normal.         Judgment: Judgment normal.           ASSESSMENT/PLAN:     38 y.o.  female with IUP at 32w3d    Advanced maternal age   There is upper normal fetal growth with an EFW of 2371 g at the 53% and the AC at the 93% on 3/27/2024.  AFV is normal. BPP is 8/8 today.     Reviewed risks with AMA, including risks for PTL, FGR, anomalies not seen, aneuploidy and stillbirth at term. She previously declined amniocentesis . She is aware of need for  evaluation. She previously already had a negative cell free DNA and negative MSAFP.    She was " advised to do fetal kick counts after 24 weeks and continue till delivery in view of the higher risk of fetal demise in utero closer to term with advanced maternal age.     With the excessive weight gain and advanced maternal age at 38 and increased BMI over 40 recommend weekly testing with Dr. Oshea starting over the next couple of weeks till delivery at 39 weeks' gestation unless needed earlier.      Recent miscarriage with confirmed T21  Previously discussed potential risk of recurrence. She had negative cell free DNA and declined amniocentesis.  She is aware of the need for routine  evaluation.      Increased BMI in pregnancy with a excessive weight  Body mass index is 41.88 kg/m².  With excessive weight gain of 32 lb since initial prenatal visit on 2023., she was advised to follow healthy and low caloric diet, and limit additional weight gain to no more than 1-2 lb a month.  Excess weight gain would be associated with gestational hypertension, gestational diabetes and adverse  outcomes, including fetal demise in utero.    With risk factors associated with increased BMI, she is to do fetal kick counts throughout the pregnancy (after 24 weeks).    It is important to lose weight after the pregnancy is over, especially before a future pregnancy was discussed. Breastfeeding may be an important tool in reducing the postpartum weight retention. Fetal risks were discussed with short term risk of fetal/ obesity and long term risk of adolescent component of metabolic syndrome.       At high risk for preeclampsia  With her increased risk for preeclampsia, she agreed to continue asa 81 mg daily until delivery.. Preeclampsia precautions reviewed.       Tachycardia, on metoprolol  Reviewed risks with tachycardia in pregnancy, including risks for recurrence of tachycardia. I discussed with her that some beta-blocker are associated with fetal growth restriction in the setting of hypertension that  most likely reflects the effects of chronic hypertension. Outside that setting, there is limited data on the effects of beta-blocker use in pregnancy; however, because of that concern it would be prudent to do a follow up ultrasound to check fetal growth every 3-4 weeks till end of the pregnancy. If evidence of fetal growth restriction is present, then closer fetal surveillance will be needed.     Advised patient to avoid caffeine/dehydration, reduces stress, and eat a balanced, healthy diet.     Patient will follow-up with cardiologist today.  Her symptoms have improved but we will defer to Cardiology.    Sinus allergies.    Suggested avoiding potential allergens and start taking Zyrtec 10 mg daily.     Follow up for NO FU. We will see any time at OB's discretion.     Future Appointments   Date Time Provider Department Center   4/11/2024  8:00 AM Yoanna Oshea MD [facility]        LUCIO involvement: Patient was evaluated and examined by Dr. Gillette. NA Ontiveros, helped in pre charting of part of note.    This note was created with the assistance of [employer] voice recognition software. There may be transcription errors as a result of using this technology, however minimal. Effort has been made to ensure accuracy of transcription, but any obvious errors or omissions should be clarified with the author of the document.

## 2024-04-09 NOTE — PROGRESS NOTES
"Chief Complaint:  Routine Prenatal Visit    History of Present Illness:  Rachael Corrales is a 38 y.o. year old  at 34w4d presents for her ob exam. She is doing well. +fetal movement. Denies vaginal bleeding, vaginal discharge, LOF or contractions. Negative preeclampsia ROS.      Review of Systems:  General/Constitutional: Fever denies. Headache denies.    Skin: Rash denies.   Respiratory: Cough denies. SOB denies. Wheezing denies .   Cardiovascular: Chest pain denies. Dizziness denies. Palpitations denies. Swelling in hands/feet denies.    Gastrointestinal: Abdominal pain denies. Constipation denies. Diarrhea denies. Heartburn denies. Nausea denies. Vomiting denies.    Genitourinary: Painful urination denies.   Gynecologic: Vaginal bleeding denies. Vaginal discharge denies. Leaking amniotic fluid denies. Contractions denies.    Psychiatric: Depressed mood denies. Suicidal thoughts denies.       Current Outpatient Medications:     aspirin (ECOTRIN) 81 MG EC tablet, Take 1 tablet (81 mg total) by mouth once daily., Disp: 30 tablet, Rfl: 3    metoprolol tartrate (LOPRESSOR) 25 MG tablet, Take 25 mg by mouth. 1/2 tablet BID, Disp: , Rfl:     prenatal vit no.124/iron/folic (PRENATAL VITAMIN ORAL), Take 1 tablet by mouth once daily., Disp: , Rfl:     prochlorperazine (COMPAZINE) 5 MG tablet, Take 1 tablet (5 mg total) by mouth every 6 (six) hours as needed (headache)., Disp: 30 tablet, Rfl: 0      Physical Exam:  /72 (BP Location: Left arm, Patient Position: Sitting, BP Method: Medium (Manual))   Temp 97.3 °F (36.3 °C) (Temporal)   Ht 5' 4" (1.626 m)   Wt 112.5 kg (248 lb)   LMP  (LMP Unknown)   BMI 42.57 kg/m²     Constitutional: General appearance: healthy, well-nourished and well-developed   Psychiatric:  Orientation to time, place and person. Normal mood and affect and active, alert   Abdomen: Auscultation/Inspection/Palpation: Gravid. No tenderness or masses. Soft, nondistended   Extremities: no " CCE    NST:   - Baseline: 120s  - Variability: moderate  - Accelerations: PRESENT: 15X15   - Decelerations: ABSENT  - Time on monitor: 20 minutes  - Category: .reactive, category 1    [place]:   - CTX: ABSENT        Assessment/Plan  1. Multigravida of advanced maternal age in first trimester  Educated  Reviewed MFM note w/ pt, no f/u needed  NIPS low risk  NST: rx cat 1     2. Other obesity affecting pregnancy, antepartum  Patient educated on risks and complications of obesity and pregnancy including but not limited to first trimester miscarriage recurrent miscarriages, congenital anomalies, HTN disorders, gestational diabetes, macrosomia, PTL&D, higher risk of fetal demise in-utero and higher risk of CS (and wound complication including infection breakdown) with obesity.     The [place] preventative task force placed the following recommendations in 2016 which ACOG supports regarding utilization of low dose 81mg ASA starting at 14 weeks and continuing through pregnancy in high risk pregnant women. The utilization of this has been shown to reduce the risk for preeclampsia by 24% in clinical trials and reduce the risk of  birth by 14% and IUGR about 20%.      Cont 81mg ASA     3. Heart palpitations  Asymptomatic today  Resolved following Metoprolol  Reviewed [facility] note from 3/27/24 with pt    4. Rh negative, antepartum  S/p rhogam 3/12/24     5. 34 weeks gestation of pregnancy  Discussed that should any of the following symptoms occur during pregnancy, patient should contact the office immediately or go to the ER after business hours: spotting or bleeding, cramping, painful urination, severe vomiting, pain in one leg or calf, sudden gush of fluid, decrease or absence of fetal movement, sudden weight gain, periorbital or facial swelling, headache with visual changes and/or photophobia.       Discussed with patient travel precautions.    RTC 1 week for NST, growth/bpp in 2 weeks      This note was transcribed by Alondra  Filipe CHANEL. There may be transcription errors as a result, however minimal. Effort has been made to ensure accuracy of transcription, but any obvious errors or omissions should be clarified with the author of the document.

## 2024-04-11 ENCOUNTER — CLINICAL SUPPORT (OUTPATIENT)
Dept: OBSTETRICS AND GYNECOLOGY | Facility: CLINIC | Age: 39
End: 2024-04-11
Payer: COMMERCIAL

## 2024-04-11 VITALS
TEMPERATURE: 97 F | DIASTOLIC BLOOD PRESSURE: 72 MMHG | BODY MASS INDEX: 42.34 KG/M2 | SYSTOLIC BLOOD PRESSURE: 130 MMHG | HEIGHT: 64 IN | WEIGHT: 248 LBS

## 2024-04-11 DIAGNOSIS — O99.210 OTHER OBESITY AFFECTING PREGNANCY, ANTEPARTUM: ICD-10-CM

## 2024-04-11 DIAGNOSIS — Z3A.34 34 WEEKS GESTATION OF PREGNANCY: ICD-10-CM

## 2024-04-11 DIAGNOSIS — R00.2 HEART PALPITATIONS: ICD-10-CM

## 2024-04-11 DIAGNOSIS — O09.521 MULTIGRAVIDA OF ADVANCED MATERNAL AGE IN FIRST TRIMESTER: Primary | ICD-10-CM

## 2024-04-11 DIAGNOSIS — O26.899 RH NEGATIVE, ANTEPARTUM: ICD-10-CM

## 2024-04-11 DIAGNOSIS — E66.8 OTHER OBESITY AFFECTING PREGNANCY, ANTEPARTUM: ICD-10-CM

## 2024-04-11 DIAGNOSIS — Z67.91 RH NEGATIVE, ANTEPARTUM: ICD-10-CM

## 2024-04-11 PROCEDURE — 0502F SUBSEQUENT PRENATAL CARE: CPT | Mod: CPTII,,, | Performed by: OBSTETRICS & GYNECOLOGY

## 2024-04-11 PROCEDURE — 59025 FETAL NON-STRESS TEST: CPT | Mod: ,,, | Performed by: OBSTETRICS & GYNECOLOGY

## 2024-04-11 NOTE — PROGRESS NOTES
"Chief Complaint:  Routine Prenatal Visit    History of Present Illness:  Rachael Corrales is a 38 y.o. year old  at 35w1d presents for her ob exam. She is doing well. +fetal movement. Denies vaginal bleeding, vaginal discharge, LOF or contractions. Negative preeclampsia ROS.      Review of Systems:  General/Constitutional: Fever denies. Headache denies.    Skin: Rash denies.   Respiratory: Cough denies. SOB denies. Wheezing denies .   Cardiovascular: Chest pain denies. Dizziness denies. Palpitations denies. Swelling in hands/feet denies.    Gastrointestinal: Abdominal pain denies. Constipation denies. Diarrhea denies. Heartburn denies. Nausea denies. Vomiting denies.    Genitourinary: Painful urination denies.   Gynecologic: Vaginal bleeding denies. Vaginal discharge denies. Leaking amniotic fluid denies. Contractions denies.    Psychiatric: Depressed mood denies. Suicidal thoughts denies.       Current Outpatient Medications:     aspirin (ECOTRIN) 81 MG EC tablet, Take 1 tablet (81 mg total) by mouth once daily., Disp: 30 tablet, Rfl: 3    metoprolol tartrate (LOPRESSOR) 25 MG tablet, Take 25 mg by mouth. 1/2 tablet BID, Disp: , Rfl:     prenatal vit no.124/iron/folic (PRENATAL VITAMIN ORAL), Take 1 tablet by mouth once daily., Disp: , Rfl:     prochlorperazine (COMPAZINE) 5 MG tablet, Take 1 tablet (5 mg total) by mouth every 6 (six) hours as needed (headache)., Disp: 30 tablet, Rfl: 0      Physical Exam:  /80 (BP Location: Left arm, Patient Position: Sitting, BP Method: Medium (Manual))   Temp 96.8 °F (36 °C) (Temporal)   Ht 5' 4" (1.626 m)   Wt 113 kg (249 lb 3.2 oz)   LMP  (LMP Unknown)   BMI 42.78 kg/m²     Constitutional: General appearance: healthy, well-nourished and well-developed   Psychiatric:  Orientation to time, place and person. Normal mood and affect and active, alert   Abdomen: Auscultation/Inspection/Palpation: Gravid. No tenderness or masses. Soft, nondistended   Extremities: no " CCE      NST:   - Baseline: 120s  - Variability: moderate  - Accelerations: PRESENT: 15X15   - Decelerations: ABSENT  - Time on monitor: 20 minutes  - Category: .reactive, category 1    [place]:   - CTX: ABSENT        Assessment/Plan  1. Multigravida of advanced maternal age in first trimester  Educated  Reviewed [facility] note w/ pt  Keep appt MFM 3/27/2024  NIPS low risk  NST: rx cat 1     2. Other obesity affecting pregnancy, antepartum  Patient educated on risks and complications of obesity and pregnancy including but not limited to first trimester miscarriage recurrent miscarriages, congenital anomalies, HTN disorders, gestational diabetes, macrosomia, PTL&D, higher risk of fetal demise in-utero and higher risk of CS (and wound complication including infection breakdown) with obesity.     The [place] preventative task force placed the following recommendations in 2016 which ACOG supports regarding utilization of low dose 81mg ASA starting at 14 weeks and continuing through pregnancy in high risk pregnant women. The utilization of this has been shown to reduce the risk for preeclampsia by 24% in clinical trials and reduce the risk of  birth by 14% and IUGR about 20%.      Cont 81mg ASA     3. Heart palpitations  Asymptomatic today  Resolved following Metoprolol  Reviewed [facility] note from 3/27/24 with pt      4. Rh negative, antepartum - s/p rhogam 3/12/24  5. 35 weeks gestation of pregnancy  Discussed that should any of the following symptoms occur during pregnancy, patient should contact the office immediately or go to the ER after business hours: spotting or bleeding, cramping, painful urination, severe vomiting, pain in one leg or calf, sudden gush of fluid, decrease or absence of fetal movement, sudden weight gain, periorbital or facial swelling, headache with visual changes and/or photophobia.       Discussed with patient travel precautions.    RTC 1 week for growth/bpp and preadmit      This note was transcribed by  Alondra Dent MA. There may be transcription errors as a result, however minimal. Effort has been made to ensure accuracy of transcription, but any obvious errors or omissions should be clarified with the author of the document.

## 2024-04-12 ENCOUNTER — HOSPITAL ENCOUNTER (OUTPATIENT)
Facility: HOSPITAL | Age: 39
Discharge: HOME | End: 2024-04-12
Attending: OBSTETRICS & GYNECOLOGY | Admitting: OBSTETRICS & GYNECOLOGY
Payer: COMMERCIAL

## 2024-04-12 VITALS
HEART RATE: 109 BPM | RESPIRATION RATE: 18 BRPM | DIASTOLIC BLOOD PRESSURE: 78 MMHG | SYSTOLIC BLOOD PRESSURE: 128 MMHG | TEMPERATURE: 98 F

## 2024-04-12 DIAGNOSIS — Z36.89 ENCOUNTER FOR TRIAGE IN PREGNANT PATIENT: ICD-10-CM

## 2024-04-12 PROCEDURE — 99211 OFF/OP EST MAY X REQ PHY/QHP: CPT | Mod: 25

## 2024-04-12 PROCEDURE — 59025 FETAL NON-STRESS TEST: CPT

## 2024-04-12 RX ORDER — ACETAMINOPHEN 500 MG
500 TABLET ORAL EVERY 6 HOURS PRN
Status: DISCONTINUED | OUTPATIENT
Start: 2024-04-12 | End: 2024-04-13 | Stop reason: HOSPADM

## 2024-04-12 RX ORDER — ONDANSETRON 4 MG/1
8 TABLET, ORALLY DISINTEGRATING ORAL EVERY 8 HOURS PRN
Status: DISCONTINUED | OUTPATIENT
Start: 2024-04-12 | End: 2024-04-13 | Stop reason: HOSPADM

## 2024-04-12 RX ORDER — SODIUM CHLORIDE, SODIUM LACTATE, POTASSIUM CHLORIDE, CALCIUM CHLORIDE 600; 310; 30; 20 MG/100ML; MG/100ML; MG/100ML; MG/100ML
INJECTION, SOLUTION INTRAVENOUS CONTINUOUS
Status: DISCONTINUED | OUTPATIENT
Start: 2024-04-12 | End: 2024-04-13 | Stop reason: HOSPADM

## 2024-04-13 NOTE — NURSING
"2000- PT ARRIVED TO LABOR UNIT C/O OF DECREASED FETAL MOVEMENT. REPORTS ONLY FEELING "ROLLING" MOVEMENTS TODAY, BUT HAS NOT FELT USUAL MOVEMENTS. ALSO REPORTS JOSÉ LUIS AGUERO, BUT DENIES VAGINAL BLEEDING OR LEAKING FLUID.    [facility] APPLIED. FETAL MOVEMENT FELT AND SEEN AS MONITOR WAS PLACED. NST IN PROGRESS.    2115- NST REACTIVE. PT REPORTS ACTIVE FETAL MOVEMENT SINCE ARRIVAL. DR HOYT UPDATED ON REASON FOR VISIT, NST, CURRENT STATUS. ORDER GIVEN FOR BPP, MAY DISCHARGE HOME IF 8/8.    2210- BPP 8/8 PER RADIOLOGY. PT EDUCATED ON KICK COUNTS, REST AND HYDRATION, LABOR PRECAUTIONS, REASONS TO RETURN TO VISIT. PT VERBALIZED UNDERSTANDING. DISCHARGED HOME IN STABLE CONDITION.      "

## 2024-04-14 ENCOUNTER — PATIENT MESSAGE (OUTPATIENT)
Dept: OTHER | Facility: OTHER | Age: 39
End: 2024-04-14
Payer: COMMERCIAL

## 2024-04-15 ENCOUNTER — CLINICAL SUPPORT (OUTPATIENT)
Dept: OBSTETRICS AND GYNECOLOGY | Facility: CLINIC | Age: 39
End: 2024-04-15
Payer: COMMERCIAL

## 2024-04-15 VITALS
WEIGHT: 249.19 LBS | HEIGHT: 64 IN | SYSTOLIC BLOOD PRESSURE: 124 MMHG | DIASTOLIC BLOOD PRESSURE: 80 MMHG | BODY MASS INDEX: 42.54 KG/M2 | TEMPERATURE: 97 F

## 2024-04-15 DIAGNOSIS — R00.2 HEART PALPITATIONS: ICD-10-CM

## 2024-04-15 DIAGNOSIS — O26.899 RH NEGATIVE, ANTEPARTUM: ICD-10-CM

## 2024-04-15 DIAGNOSIS — O09.521 MULTIGRAVIDA OF ADVANCED MATERNAL AGE IN FIRST TRIMESTER: Primary | ICD-10-CM

## 2024-04-15 DIAGNOSIS — Z67.91 RH NEGATIVE, ANTEPARTUM: ICD-10-CM

## 2024-04-15 DIAGNOSIS — O99.210 OTHER OBESITY AFFECTING PREGNANCY, ANTEPARTUM: ICD-10-CM

## 2024-04-15 DIAGNOSIS — Z3A.35 35 WEEKS GESTATION OF PREGNANCY: ICD-10-CM

## 2024-04-15 DIAGNOSIS — E66.8 OTHER OBESITY AFFECTING PREGNANCY, ANTEPARTUM: ICD-10-CM

## 2024-04-15 PROCEDURE — 0502F SUBSEQUENT PRENATAL CARE: CPT | Mod: CPTII,,, | Performed by: OBSTETRICS & GYNECOLOGY

## 2024-04-15 PROCEDURE — 59025 FETAL NON-STRESS TEST: CPT | Mod: ,,, | Performed by: OBSTETRICS & GYNECOLOGY

## 2024-04-19 NOTE — PROGRESS NOTES
Chief Complaint:  Routine Prenatal Visit    History of Present Illness:  Rachael Corrales is a 38 y.o. year old  at 36w1d presents for preadmit ob. She is doing well. +fetal movement. Denies vaginal bleeding, vaginal discharge, LOF or contractions. Negative preeclampsia ROS.    Denies palp    Review of Systems:  General/Constitutional: Fever denies .    Skin: Rash denies.   Respiratory: Cough denies. SOB denies. Wheezing denies .   Cardiovascular: Chest pain denies. Dizziness denies. Palpitations denies. Swelling in hands/feet denies    Gastrointestinal: Abdominal pain denies. Constipation denies. Diarrhea denies. Heartburn denies. Nausea denies. Vomiting denies    Genitourinary: Painful urination denies.   Gynecologic: Vaginal bleeding denies. Vaginal discharge Normal. Leaking amniotic fluid denies. Contractions denies    Psychiatric: Depressed mood denies. Suicidal thoughts denies.     OB History    Para Term  AB Living   5 3 3 0 1 3   SAB IAB Ectopic Multiple Live Births   1 0 0 0 3      # 1 - Date: 06, Sex: Female, Weight: 3.6 kg (7 lb 15 oz), GA: 40w0d, Type: Vaginal, Spontaneous, Apgar1: None, Apgar5: None, Living: Living, Birth Comments: None    # 2 - Date: 09, Sex: Female, Weight: 2.608 kg (5 lb 12 oz), GA: 37w0d, Type: Vaginal, Spontaneous, Apgar1: None, Apgar5: None, Living: Living, Birth Comments: None    # 3 - Date: 19, Sex: Male, Weight: 3.345 kg (7 lb 6 oz), GA: 40w0d, Type: Vaginal, Spontaneous, Apgar1: None, Apgar5: None, Living: Living, Birth Comments: None    # 4 - Date: 23, Sex: Unknown, Weight: None, GA: None, Type: Spontaneous , Apgar1: None, Apgar5: None, Living: Fetal Demise, Birth Comments: Suction D&C... BABY HAD TRISOMY 21.    # 5 - Date: None, Sex: None, Weight: None, GA: None, Type: None, Apgar1: None, Apgar5: None, Living: None, Birth Comments: None      Past Medical History:   Diagnosis Date    Abnormal Pap smear of cervix 2017    HPV,  NORMAL NOW    Mental disorder 2017    ADHD AND ANXIETY, NOT CURRENTLY    Missed  2023    Rh incompatibility     A NEG       Current Outpatient Medications:     aspirin (ECOTRIN) 81 MG EC tablet, Take 1 tablet (81 mg total) by mouth once daily., Disp: 30 tablet, Rfl: 3    metoprolol tartrate (LOPRESSOR) 25 MG tablet, Take 25 mg by mouth. 1/2 tablet BID, Disp: , Rfl:     prenatal vit no.124/iron/folic (PRENATAL VITAMIN ORAL), Take 1 tablet by mouth once daily., Disp: , Rfl:     prochlorperazine (COMPAZINE) 5 MG tablet, Take 1 tablet (5 mg total) by mouth every 6 (six) hours as needed (headache)., Disp: 30 tablet, Rfl: 0    Review of patient's allergies indicates:  No Known Allergies    Past Surgical History:   Procedure Laterality Date    DILATION AND CURETTAGE OF UTERUS USING SUCTION N/A 2023    Procedure: DILATION AND CURETTAGE, UTERUS, USING SUCTION;  Surgeon: Yoanna Oshea MD;  Location: [facility];  Service: OB/GYN;  Laterality: N/A;    WISDOM TOOTH EXTRACTION      ALL FOUR REMOVED     Family History   Problem Relation Name Age of Onset    Breast cancer Maternal Grandmother Amrita     Hypertension Brother      Colon cancer Neg Hx      Ovarian cancer Neg Hx      Uterine cancer Neg Hx      Cervical cancer Neg Hx       Social History     Socioeconomic History    Marital status:    Tobacco Use    Smoking status: Never     Passive exposure: Never    Smokeless tobacco: Never   Substance and Sexual Activity    Alcohol use: Not Currently    Drug use: Never    Sexual activity: Yes     Partners: Male       Physical Exam:  /74   Wt 114.6 kg (252 lb 10.4 oz)   LMP  (LMP Unknown)   BMI 43.37 kg/m²     Constitutional: General appearance: healthy, well-nourished and well-developed   Psychiatric:  Orientation to time, place and person. Normal mood and affect and active, alert  Cardiovascular: RRR, no murmurs, gallops or rub  Respiratory: clear to auscultate bilaterally, no wheezes, rales, or  rhonchi  Abdomen: Auscultation/Inspection/Palpation: No tenderness or masses. Soft, nondistended   Extremities: no CCE    Cvx: 0/0/-3  FH: 36cm  FHT: 150      ABO/Rh:   Lab Results   Component Value Date/Time    ABORH A NEG 03/12/2024 10:20 AM    ABSCREEN NEG 03/12/2024 10:20 AM     H&H:  Lab Results   Component Value Date/Time    HGB 11.7 (L) 03/12/2024 10:20 AM    HCT 34.8 (L) 03/12/2024 10:20 AM     Platelet:  Lab Results   Component Value Date/Time     03/12/2024 10:20 AM     Osulivan:   Lab Results   Component Value Date/Time    LJBI9UQ 129 02/06/2024 11:20 AM     HIV:   Lab Results   Component Value Date/Time    HIV Nonreactive 10/16/2023 12:57 PM     RPR:  Lab Results   Component Value Date/Time    SYPHAB Nonreactive 03/12/2024 10:20 AM     Hepatitis B Surface Antigen:  Lab Results   Component Value Date/Time    HEPBSURFAG Negative 10/16/2023 12:57 PM     Hepatitis C:  Lab Results   Component Value Date/Time    HEPCAB Nonreactive 10/16/2023 12:57 PM     Rubella Immune Status:  Lab Results   Component Value Date/Time    RUBELLAIGG 32.60 10/16/2023 12:57 PM     Chlamydia:  Lab Results   Component Value Date/Time    CTRNA Negative 12/12/2023 09:34 AM     Gonorrhea:  Lab Results   Component Value Date/Time    FACVSPECIMEN Negative 12/12/2023 09:34 AM        Last PAP Date: 3/30/2023      Assessment/Plan:  1. Multigravida of advanced maternal age in first trimester  Educated  Reviewed MFM note w/ pt  NIPS low risk    Growth Scan: 4/22/24  EFW: 3102g  EUA: 37w2d  75th percentile    Biophysical Profile: 4/22/24  2/2 - Breathing  2/2 - Tone  2/2 - Movement  2/2 - Fluid    Score: 8/8     2. Other obesity affecting pregnancy, antepartum  Patient educated on risks and complications of obesity and pregnancy including but not limited to first trimester miscarriage recurrent miscarriages, congenital anomalies, HTN disorders, gestational diabetes, macrosomia, PTL&D, higher risk of fetal demise in-utero and higher risk  of CS (and wound complication including infection breakdown) with obesity.     The [place] preventative task force placed the following recommendations in 2016 which ACOG supports regarding utilization of low dose 81mg ASA starting at 14 weeks and continuing through pregnancy in high risk pregnant women. The utilization of this has been shown to reduce the risk for preeclampsia by 24% in clinical trials and reduce the risk of  birth by 14% and IUGR about 20%.      Cont 81mg ASA     3. Consult for female sterilization  Discussed permanent sterilization in detail and alternative contraceptive options including natural family planning, barrier, oral contraceptives, patch, NuvaRing, Depo-Provera, Nexplanon, IUDs, abstinence.  Discussed risks of both sterilization including surgical risks, risk of failure, risk of ectopic pregnancy, and risk of regret.   Pt wants to proceed with surgery.  Consent given    Desires tubal at this time  Partner plans on getting a vasectomy    4. Rh negative, antepartum - s/p rhogam 3/12/24  5. 36 weeks gestation of pregnancy   Discussed that should any of the following symptoms occur during pregnancy, patient should contact the office immediately or go to the ER after business hours: spotting or bleeding, cramping, painful urination, severe vomiting, pain in one leg or calf, sudden gush of fluid, decrease or absence of fetal movement, sudden weight gain, periorbital or facial swelling, headache with visual changes and/or photophobia.       Discussed with patient travel precautions.       Discussed risks and complications of vaginal delivery and . Reviewed delivery process including induction, c/s and anesthesia. Explained preadmit process. Gave labor precautions and advised to go to OB unit if rupture of membranes occurs, pt. experiences bleeding, contractions get closer than five minutes apart, or pt. notices lack of or decrease in fetal movement. Pt. voices understanding and  gives informed consent.       GBS and gc/cz/tv collected  39wk induction 5/13/24  Follow up 1 week for NST      This note was transcribed by Alondra Dent MA. There may be transcription errors as a result, however minimal. Effort has been made to ensure accuracy of transcription, but any obvious errors or omissions should be clarified with the author of the document.

## 2024-04-19 NOTE — H&P (VIEW-ONLY)
Chief Complaint:  Routine Prenatal Visit    History of Present Illness:  Rachael Corrales is a 38 y.o. year old  at 36w1d presents for preadmit ob. She is doing well. +fetal movement. Denies vaginal bleeding, vaginal discharge, LOF or contractions. Negative preeclampsia ROS.    Denies palp    Review of Systems:  General/Constitutional: Fever denies .    Skin: Rash denies.   Respiratory: Cough denies. SOB denies. Wheezing denies .   Cardiovascular: Chest pain denies. Dizziness denies. Palpitations denies. Swelling in hands/feet denies    Gastrointestinal: Abdominal pain denies. Constipation denies. Diarrhea denies. Heartburn denies. Nausea denies. Vomiting denies    Genitourinary: Painful urination denies.   Gynecologic: Vaginal bleeding denies. Vaginal discharge Normal. Leaking amniotic fluid denies. Contractions denies    Psychiatric: Depressed mood denies. Suicidal thoughts denies.     OB History    Para Term  AB Living   5 3 3 0 1 3   SAB IAB Ectopic Multiple Live Births   1 0 0 0 3      # 1 - Date: 06, Sex: Female, Weight: 3.6 kg (7 lb 15 oz), GA: 40w0d, Type: Vaginal, Spontaneous, Apgar1: None, Apgar5: None, Living: Living, Birth Comments: None    # 2 - Date: 09, Sex: Female, Weight: 2.608 kg (5 lb 12 oz), GA: 37w0d, Type: Vaginal, Spontaneous, Apgar1: None, Apgar5: None, Living: Living, Birth Comments: None    # 3 - Date: 19, Sex: Male, Weight: 3.345 kg (7 lb 6 oz), GA: 40w0d, Type: Vaginal, Spontaneous, Apgar1: None, Apgar5: None, Living: Living, Birth Comments: None    # 4 - Date: 23, Sex: Unknown, Weight: None, GA: None, Type: Spontaneous , Apgar1: None, Apgar5: None, Living: Fetal Demise, Birth Comments: Suction D&C... BABY HAD TRISOMY 21.    # 5 - Date: None, Sex: None, Weight: None, GA: None, Type: None, Apgar1: None, Apgar5: None, Living: None, Birth Comments: None      Past Medical History:   Diagnosis Date    Abnormal Pap smear of cervix 2017    HPV,  NORMAL NOW    Mental disorder 2017    ADHD AND ANXIETY, NOT CURRENTLY    Missed  2023    Rh incompatibility     A NEG       Current Outpatient Medications:     aspirin (ECOTRIN) 81 MG EC tablet, Take 1 tablet (81 mg total) by mouth once daily., Disp: 30 tablet, Rfl: 3    metoprolol tartrate (LOPRESSOR) 25 MG tablet, Take 25 mg by mouth. 1/2 tablet BID, Disp: , Rfl:     prenatal vit no.124/iron/folic (PRENATAL VITAMIN ORAL), Take 1 tablet by mouth once daily., Disp: , Rfl:     prochlorperazine (COMPAZINE) 5 MG tablet, Take 1 tablet (5 mg total) by mouth every 6 (six) hours as needed (headache)., Disp: 30 tablet, Rfl: 0    Review of patient's allergies indicates:  No Known Allergies    Past Surgical History:   Procedure Laterality Date    DILATION AND CURETTAGE OF UTERUS USING SUCTION N/A 2023    Procedure: DILATION AND CURETTAGE, UTERUS, USING SUCTION;  Surgeon: Yoanna Oshea MD;  Location: [facility];  Service: OB/GYN;  Laterality: N/A;    WISDOM TOOTH EXTRACTION      ALL FOUR REMOVED     Family History   Problem Relation Name Age of Onset    Breast cancer Maternal Grandmother Amrita     Hypertension Brother      Colon cancer Neg Hx      Ovarian cancer Neg Hx      Uterine cancer Neg Hx      Cervical cancer Neg Hx       Social History     Socioeconomic History    Marital status:    Tobacco Use    Smoking status: Never     Passive exposure: Never    Smokeless tobacco: Never   Substance and Sexual Activity    Alcohol use: Not Currently    Drug use: Never    Sexual activity: Yes     Partners: Male       Physical Exam:  /74   Wt 114.6 kg (252 lb 10.4 oz)   LMP  (LMP Unknown)   BMI 43.37 kg/m²     Constitutional: General appearance: healthy, well-nourished and well-developed   Psychiatric:  Orientation to time, place and person. Normal mood and affect and active, alert  Cardiovascular: RRR, no murmurs, gallops or rub  Respiratory: clear to auscultate bilaterally, no wheezes, rales, or  rhonchi  Abdomen: Auscultation/Inspection/Palpation: No tenderness or masses. Soft, nondistended   Extremities: no CCE    Cvx: 0/0/-3  FH: 36cm  FHT: 150      ABO/Rh:   Lab Results   Component Value Date/Time    ABORH A NEG 03/12/2024 10:20 AM    ABSCREEN NEG 03/12/2024 10:20 AM     H&H:  Lab Results   Component Value Date/Time    HGB 11.7 (L) 03/12/2024 10:20 AM    HCT 34.8 (L) 03/12/2024 10:20 AM     Platelet:  Lab Results   Component Value Date/Time     03/12/2024 10:20 AM     Osulivan:   Lab Results   Component Value Date/Time    HSOP8OJ 129 02/06/2024 11:20 AM     HIV:   Lab Results   Component Value Date/Time    HIV Nonreactive 10/16/2023 12:57 PM     RPR:  Lab Results   Component Value Date/Time    SYPHAB Nonreactive 03/12/2024 10:20 AM     Hepatitis B Surface Antigen:  Lab Results   Component Value Date/Time    HEPBSURFAG Negative 10/16/2023 12:57 PM     Hepatitis C:  Lab Results   Component Value Date/Time    HEPCAB Nonreactive 10/16/2023 12:57 PM     Rubella Immune Status:  Lab Results   Component Value Date/Time    RUBELLAIGG 32.60 10/16/2023 12:57 PM     Chlamydia:  Lab Results   Component Value Date/Time    CTRNA Negative 12/12/2023 09:34 AM     Gonorrhea:  Lab Results   Component Value Date/Time    FACVSPECIMEN Negative 12/12/2023 09:34 AM        Last PAP Date: 3/30/2023      Assessment/Plan:  1. Multigravida of advanced maternal age in first trimester  Educated  Reviewed MFM note w/ pt  NIPS low risk    Growth Scan: 4/22/24  EFW: 3102g  EUA: 37w2d  75th percentile    Biophysical Profile: 4/22/24  2/2 - Breathing  2/2 - Tone  2/2 - Movement  2/2 - Fluid    Score: 8/8     2. Other obesity affecting pregnancy, antepartum  Patient educated on risks and complications of obesity and pregnancy including but not limited to first trimester miscarriage recurrent miscarriages, congenital anomalies, HTN disorders, gestational diabetes, macrosomia, PTL&D, higher risk of fetal demise in-utero and higher risk  of CS (and wound complication including infection breakdown) with obesity.     The [place] preventative task force placed the following recommendations in 2016 which ACOG supports regarding utilization of low dose 81mg ASA starting at 14 weeks and continuing through pregnancy in high risk pregnant women. The utilization of this has been shown to reduce the risk for preeclampsia by 24% in clinical trials and reduce the risk of  birth by 14% and IUGR about 20%.      Cont 81mg ASA     3. Consult for female sterilization  Discussed permanent sterilization in detail and alternative contraceptive options including natural family planning, barrier, oral contraceptives, patch, NuvaRing, Depo-Provera, Nexplanon, IUDs, abstinence.  Discussed risks of both sterilization including surgical risks, risk of failure, risk of ectopic pregnancy, and risk of regret.   Pt wants to proceed with surgery.  Consent given    Desires tubal at this time  Partner plans on getting a vasectomy    4. Rh negative, antepartum - s/p rhogam 3/12/24  5. 36 weeks gestation of pregnancy   Discussed that should any of the following symptoms occur during pregnancy, patient should contact the office immediately or go to the ER after business hours: spotting or bleeding, cramping, painful urination, severe vomiting, pain in one leg or calf, sudden gush of fluid, decrease or absence of fetal movement, sudden weight gain, periorbital or facial swelling, headache with visual changes and/or photophobia.       Discussed with patient travel precautions.       Discussed risks and complications of vaginal delivery and . Reviewed delivery process including induction, c/s and anesthesia. Explained preadmit process. Gave labor precautions and advised to go to OB unit if rupture of membranes occurs, pt. experiences bleeding, contractions get closer than five minutes apart, or pt. notices lack of or decrease in fetal movement. Pt. voices understanding and  gives informed consent.       GBS and gc/cz/tv collected  39wk induction 5/13/24  Follow up 1 week for NST      This note was transcribed by Alondra Dent MA. There may be transcription errors as a result, however minimal. Effort has been made to ensure accuracy of transcription, but any obvious errors or omissions should be clarified with the author of the document.

## 2024-04-22 ENCOUNTER — ROUTINE PRENATAL (OUTPATIENT)
Dept: OBSTETRICS AND GYNECOLOGY | Facility: CLINIC | Age: 39
End: 2024-04-22
Payer: COMMERCIAL

## 2024-04-22 VITALS
DIASTOLIC BLOOD PRESSURE: 74 MMHG | WEIGHT: 252.63 LBS | BODY MASS INDEX: 43.37 KG/M2 | SYSTOLIC BLOOD PRESSURE: 130 MMHG

## 2024-04-22 DIAGNOSIS — Z3A.36 36 WEEKS GESTATION OF PREGNANCY: ICD-10-CM

## 2024-04-22 DIAGNOSIS — Z30.09 CONSULTATION FOR FEMALE STERILIZATION: ICD-10-CM

## 2024-04-22 DIAGNOSIS — O09.521 MULTIGRAVIDA OF ADVANCED MATERNAL AGE IN FIRST TRIMESTER: Primary | ICD-10-CM

## 2024-04-22 DIAGNOSIS — E66.8 OTHER OBESITY AFFECTING PREGNANCY, ANTEPARTUM: ICD-10-CM

## 2024-04-22 DIAGNOSIS — Z30.09 SCREENING AND EVALUATION FOR FEMALE STERILIZATION: ICD-10-CM

## 2024-04-22 DIAGNOSIS — O26.899 RH NEGATIVE, ANTEPARTUM: ICD-10-CM

## 2024-04-22 DIAGNOSIS — Z67.91 RH NEGATIVE, ANTEPARTUM: ICD-10-CM

## 2024-04-22 DIAGNOSIS — O99.210 OTHER OBESITY AFFECTING PREGNANCY, ANTEPARTUM: ICD-10-CM

## 2024-04-22 PROCEDURE — 87653 STREP B DNA AMP PROBE: CPT | Performed by: OBSTETRICS & GYNECOLOGY

## 2024-04-22 PROCEDURE — 76816 OB US FOLLOW-UP PER FETUS: CPT | Mod: ,,, | Performed by: OBSTETRICS & GYNECOLOGY

## 2024-04-22 PROCEDURE — 76819 FETAL BIOPHYS PROFIL W/O NST: CPT | Mod: ,,, | Performed by: OBSTETRICS & GYNECOLOGY

## 2024-04-22 PROCEDURE — 0502F SUBSEQUENT PRENATAL CARE: CPT | Mod: CPTII,,, | Performed by: OBSTETRICS & GYNECOLOGY

## 2024-04-22 RX ORDER — IBUPROFEN 600 MG/1
600 TABLET ORAL EVERY 6 HOURS
Status: CANCELLED | OUTPATIENT
Start: 2024-04-22

## 2024-04-22 RX ORDER — BISACODYL 10 MG/1
10 SUPPOSITORY RECTAL DAILY PRN
Status: CANCELLED | OUTPATIENT
Start: 2024-04-22

## 2024-04-22 RX ORDER — OXYTOCIN/RINGER'S LACTATE 30/500 ML
95 PLASTIC BAG, INJECTION (ML) INTRAVENOUS ONCE AS NEEDED
Status: CANCELLED | OUTPATIENT
Start: 2024-04-22 | End: 2035-09-19

## 2024-04-22 RX ORDER — OXYTOCIN 10 [USP'U]/ML
10 INJECTION, SOLUTION INTRAMUSCULAR; INTRAVENOUS ONCE AS NEEDED
Status: CANCELLED | OUTPATIENT
Start: 2024-04-22 | End: 2035-09-19

## 2024-04-22 RX ORDER — OXYTOCIN/RINGER'S LACTATE 30/500 ML
95 PLASTIC BAG, INJECTION (ML) INTRAVENOUS ONCE
Status: CANCELLED | OUTPATIENT
Start: 2024-04-22 | End: 2024-04-22

## 2024-04-22 RX ORDER — METHYLERGONOVINE MALEATE 0.2 MG/ML
200 INJECTION INTRAVENOUS ONCE AS NEEDED
Status: CANCELLED | OUTPATIENT
Start: 2024-04-22 | End: 2035-09-19

## 2024-04-22 RX ORDER — HYDROCODONE BITARTRATE AND ACETAMINOPHEN 7.5; 325 MG/1; MG/1
1 TABLET ORAL EVERY 4 HOURS PRN
Status: CANCELLED | OUTPATIENT
Start: 2024-04-22

## 2024-04-22 RX ORDER — HYDROCORTISONE 25 MG/G
CREAM TOPICAL 3 TIMES DAILY PRN
Status: CANCELLED | OUTPATIENT
Start: 2024-04-22

## 2024-04-22 RX ORDER — ALUMINUM HYDROXIDE, MAGNESIUM HYDROXIDE, AND SIMETHICONE 1200; 120; 1200 MG/30ML; MG/30ML; MG/30ML
30 SUSPENSION ORAL EVERY 6 HOURS PRN
Status: CANCELLED | OUTPATIENT
Start: 2024-04-22

## 2024-04-22 RX ORDER — SODIUM CHLORIDE 0.9 % (FLUSH) 0.9 %
3 SYRINGE (ML) INJECTION
Status: CANCELLED | OUTPATIENT
Start: 2024-04-22

## 2024-04-22 RX ORDER — OXYTOCIN/RINGER'S LACTATE 30/500 ML
334 PLASTIC BAG, INJECTION (ML) INTRAVENOUS ONCE AS NEEDED
Status: CANCELLED | OUTPATIENT
Start: 2024-04-22 | End: 2035-09-19

## 2024-04-22 RX ORDER — MISOPROSTOL 100 UG/1
800 TABLET ORAL ONCE AS NEEDED
Status: CANCELLED | OUTPATIENT
Start: 2024-04-22 | End: 2035-09-19

## 2024-04-22 RX ORDER — PRENATAL WITH FERROUS FUM AND FOLIC ACID 3080; 920; 120; 400; 22; 1.84; 3; 20; 10; 1; 12; 200; 27; 25; 2 [IU]/1; [IU]/1; MG/1; [IU]/1; MG/1; MG/1; MG/1; MG/1; MG/1; MG/1; UG/1; MG/1; MG/1; MG/1; MG/1
1 TABLET ORAL DAILY
Status: CANCELLED | OUTPATIENT
Start: 2024-04-22

## 2024-04-22 RX ORDER — ONDANSETRON 4 MG/1
8 TABLET, ORALLY DISINTEGRATING ORAL EVERY 8 HOURS PRN
Status: CANCELLED | OUTPATIENT
Start: 2024-04-22

## 2024-04-22 RX ORDER — LANOLIN ALCOHOL/MO/W.PET/CERES
1 CREAM (GRAM) TOPICAL DAILY
Status: CANCELLED | OUTPATIENT
Start: 2024-04-22

## 2024-04-22 RX ORDER — TRANEXAMIC ACID 10 MG/ML
1000 INJECTION, SOLUTION INTRAVENOUS EVERY 30 MIN PRN
Status: CANCELLED | OUTPATIENT
Start: 2024-04-22

## 2024-04-22 RX ORDER — DIPHENOXYLATE HYDROCHLORIDE AND ATROPINE SULFATE 2.5; .025 MG/1; MG/1
2 TABLET ORAL EVERY 6 HOURS PRN
Status: CANCELLED | OUTPATIENT
Start: 2024-04-22

## 2024-04-22 RX ORDER — SIMETHICONE 80 MG
1 TABLET,CHEWABLE ORAL EVERY 6 HOURS PRN
Status: CANCELLED | OUTPATIENT
Start: 2024-04-22

## 2024-04-22 RX ORDER — DOCUSATE SODIUM 100 MG/1
200 CAPSULE, LIQUID FILLED ORAL 2 TIMES DAILY PRN
Status: CANCELLED | OUTPATIENT
Start: 2024-04-22

## 2024-04-22 RX ORDER — DIPHENHYDRAMINE HCL 25 MG
25 CAPSULE ORAL EVERY 4 HOURS PRN
Status: CANCELLED | OUTPATIENT
Start: 2024-04-22

## 2024-04-22 RX ORDER — DIPHENHYDRAMINE HYDROCHLORIDE 50 MG/ML
25 INJECTION INTRAMUSCULAR; INTRAVENOUS EVERY 4 HOURS PRN
Status: CANCELLED | OUTPATIENT
Start: 2024-04-22

## 2024-04-22 RX ORDER — HYDROMORPHONE HYDROCHLORIDE 1 MG/ML
1 INJECTION, SOLUTION INTRAMUSCULAR; INTRAVENOUS; SUBCUTANEOUS EVERY 6 HOURS PRN
Status: CANCELLED | OUTPATIENT
Start: 2024-04-22

## 2024-04-22 RX ORDER — ADHESIVE BANDAGE
30 BANDAGE TOPICAL NIGHTLY PRN
Status: CANCELLED | OUTPATIENT
Start: 2024-04-22

## 2024-04-22 RX ORDER — CARBOPROST TROMETHAMINE 250 UG/ML
250 INJECTION, SOLUTION INTRAMUSCULAR
Status: CANCELLED | OUTPATIENT
Start: 2024-04-22

## 2024-04-22 RX ORDER — ACETAMINOPHEN 325 MG/1
650 TABLET ORAL EVERY 6 HOURS PRN
Status: CANCELLED | OUTPATIENT
Start: 2024-04-22

## 2024-04-22 RX ORDER — PROCHLORPERAZINE EDISYLATE 5 MG/ML
5 INJECTION INTRAMUSCULAR; INTRAVENOUS EVERY 6 HOURS PRN
Status: CANCELLED | OUTPATIENT
Start: 2024-04-22

## 2024-04-22 RX ORDER — MISOPROSTOL 100 UG/1
800 TABLET ORAL ONCE AS NEEDED
Status: CANCELLED | OUTPATIENT
Start: 2024-04-22

## 2024-04-23 ENCOUNTER — HOSPITAL ENCOUNTER (OUTPATIENT)
Dept: PREADMISSION TESTING | Facility: HOSPITAL | Age: 39
Discharge: HOME | End: 2024-04-23
Payer: COMMERCIAL

## 2024-04-23 DIAGNOSIS — O09.521 MULTIGRAVIDA OF ADVANCED MATERNAL AGE IN FIRST TRIMESTER: ICD-10-CM

## 2024-04-23 DIAGNOSIS — Z3A.36 36 WEEKS GESTATION OF PREGNANCY: Primary | ICD-10-CM

## 2024-04-23 PROBLEM — Z30.09 SCREENING AND EVALUATION FOR FEMALE STERILIZATION: Status: ACTIVE | Noted: 2024-04-23

## 2024-04-23 LAB
BASOPHILS # BLD AUTO: 0.02 X10(3)/MCL (ref 0.01–0.08)
BASOPHILS NFR BLD AUTO: 0.2 % (ref 0.1–1.2)
EOSINOPHIL # BLD AUTO: 0.03 X10(3)/MCL (ref 0.04–0.36)
EOSINOPHIL NFR BLD AUTO: 0.3 % (ref 0.7–7)
ERYTHROCYTE [DISTWIDTH] IN BLOOD BY AUTOMATED COUNT: 12.9 % (ref 11–14.5)
HBV SURFACE AG SERPL QL IA: NEGATIVE
HBV SURFACE AG SERPL QL IA: NORMAL
HCT VFR BLD AUTO: 30.7 % (ref 36–48)
HGB BLD-MCNC: 10.3 G/DL (ref 11.8–16)
HIV 1+2 AB+HIV1 P24 AG SERPL QL IA: NONREACTIVE
IMM GRANULOCYTES # BLD AUTO: 0.07 X10(3)/MCL (ref 0–0.03)
IMM GRANULOCYTES NFR BLD AUTO: 0.7 % (ref 0–0.5)
LYMPHOCYTES # BLD AUTO: 1.76 X10(3)/MCL (ref 1.16–3.74)
LYMPHOCYTES NFR BLD AUTO: 17.4 % (ref 20–55)
MCH RBC QN AUTO: 27.5 PG (ref 27–34)
MCHC RBC AUTO-ENTMCNC: 33.6 G/DL (ref 31–37)
MCV RBC AUTO: 81.9 FL (ref 79–99)
MONOCYTES # BLD AUTO: 0.62 X10(3)/MCL (ref 0.24–0.36)
MONOCYTES NFR BLD AUTO: 6.1 % (ref 4.7–12.5)
NEUTROPHILS # BLD AUTO: 7.63 X10(3)/MCL (ref 1.56–6.13)
NEUTROPHILS NFR BLD AUTO: 75.3 % (ref 37–73)
NRBC BLD AUTO-RTO: 0 %
PLATELET # BLD AUTO: 202 X10(3)/MCL (ref 140–371)
PMV BLD AUTO: 10.9 FL (ref 9.4–12.4)
RBC # BLD AUTO: 3.75 X10(6)/MCL (ref 4–5.1)
T PALLIDUM AB SER QL: NONREACTIVE
WBC # SPEC AUTO: 10.13 X10(3)/MCL (ref 4–11.5)

## 2024-04-23 PROCEDURE — 87389 HIV-1 AG W/HIV-1&-2 AB AG IA: CPT | Performed by: OBSTETRICS & GYNECOLOGY

## 2024-04-23 PROCEDURE — 87340 HEPATITIS B SURFACE AG IA: CPT | Performed by: OBSTETRICS & GYNECOLOGY

## 2024-04-23 PROCEDURE — 86780 TREPONEMA PALLIDUM: CPT | Performed by: OBSTETRICS & GYNECOLOGY

## 2024-04-23 PROCEDURE — 85025 COMPLETE CBC W/AUTO DIFF WBC: CPT | Performed by: OBSTETRICS & GYNECOLOGY

## 2024-04-23 NOTE — DISCHARGE INSTRUCTIONS
Things to Bring to the Hospital:    To help make your stay as comfortable as possible you should bring the following items when you come to the hospital to deliver your baby:    1.  TOOTHBRUSH  2.  TOOTHPASTE  3.  SHAMPOO/CONDITIONER   4.  DEODORANT  5.  HAIRBRUSH OR COMB  6.  UNDERGARMENTS  7.  SOAP OR BODY WASH  8.  SANITARY NAPKINS/OVERNIGHTS IF YOU PREFER TO USE YOUR OWN PRODUCTS.   9.  NURSING PADS FOR YOUR BRA AND A BREAST PUMP IF YOU OWN ONE.     Other items needed for mom and baby include:    1.  Bras (2-3) should be packed and should be ones that were worn during pregnancy or nursing bras for breastfeeding.   2.  Gowns, slippers, and a robe.  3.  One outfit for baby to go home in as well as a blanket.  The hospital will provide baby clothes, blankets, diapers, and wipes during hospital stay.    4.  Federally approved car seat is required for the infant to go home at discharge.    OB VISITATION POLICY    The OB Department is open to family for patient visitation.  Smoking is not allowed on the premises.  Visitors must check with staff before entering a patient's room if there is a sign posted on the door or in the gallo.   Visitors may be in the room with baby if they are free from infection or any signs and symptoms of illness.  All visitors must use hand  or soap and water before touching the baby.   No children under age 12 are allowed to sleep overnight or to be left unattended in room.   Our rooms only accommodate one overnight guest.  While in labor and delivery room, you will be allowed 2 support persons if you choose and one additional guest will be allowed at the discretion of the physician or nurse.   The 2-3 labor and delivery support people may be interchangeable at the discretion of the OB nurse in charge of your care.   Any other visitors will be directed to the waiting room or post-partum room until after delivery.   No one will be allowed to stay in the labor and delivery  hallway.  After delivery, visitors will not be limited unless a procedure is in progress or your conditions warrants it.   Every day between 1:00 and 3:00 pm visitors are discouraged from entering the OB unit to encourage a mother/baby rest and bonding time.   Videos and photographs are not allowed to be taken during the delivery process. They may be taken after the baby is born and declared in good condition by nursing staff or physician.   If you will be having a delivery by , you will be allowed (1) support person in operating room unless the procedure is an emergency or under general anesthesia. No video or photography is allowed until after the procedure is complete.  The support person present will be asked to leave the operating room with the baby after delivery or if any problems arise.   Family and friends should be made familiar with the visitor policy for our facility, so that we may insure that the safety and well-being of you and your baby during this very important time.

## 2024-04-24 LAB — STREP B PCR (OHS): NOT DETECTED

## 2024-05-02 ENCOUNTER — ANESTHESIA EVENT (OUTPATIENT)
Dept: OBSTETRICS AND GYNECOLOGY | Facility: HOSPITAL | Age: 39
End: 2024-05-02
Payer: COMMERCIAL

## 2024-05-02 ENCOUNTER — HOSPITAL ENCOUNTER (INPATIENT)
Facility: HOSPITAL | Age: 39
LOS: 3 days | Discharge: HOME | End: 2024-05-05
Attending: OBSTETRICS & GYNECOLOGY | Admitting: OBSTETRICS & GYNECOLOGY
Payer: COMMERCIAL

## 2024-05-02 ENCOUNTER — ANESTHESIA (OUTPATIENT)
Dept: OBSTETRICS AND GYNECOLOGY | Facility: HOSPITAL | Age: 39
End: 2024-05-02
Payer: COMMERCIAL

## 2024-05-02 DIAGNOSIS — O47.9 UTERINE CONTRACTIONS: Primary | ICD-10-CM

## 2024-05-02 DIAGNOSIS — O09.521 MULTIGRAVIDA OF ADVANCED MATERNAL AGE IN FIRST TRIMESTER: ICD-10-CM

## 2024-05-02 PROBLEM — Z3A.37 37 WEEKS GESTATION OF PREGNANCY: Status: ACTIVE | Noted: 2024-05-02

## 2024-05-02 LAB
ABO AND RH: NORMAL
ALBUMIN SERPL-MCNC: 3.2 G/DL (ref 3.4–5)
ALBUMIN/GLOB SERPL: 1 RATIO
ALP SERPL-CCNC: 105 UNIT/L (ref 50–144)
ALT SERPL-CCNC: 17 UNIT/L (ref 1–45)
ANION GAP SERPL CALC-SCNC: 5 MEQ/L (ref 2–13)
ANTIBODY SCREEN: NORMAL
AST SERPL-CCNC: 24 UNIT/L (ref 14–36)
BASOPHILS # BLD AUTO: 0.02 X10(3)/MCL (ref 0.01–0.08)
BASOPHILS NFR BLD AUTO: 0.2 % (ref 0.1–1.2)
BILIRUB SERPL-MCNC: 0.4 MG/DL (ref 0–1)
BUN SERPL-MCNC: 10 MG/DL (ref 7–20)
CALCIUM SERPL-MCNC: 9 MG/DL (ref 8.4–10.2)
CHLORIDE SERPL-SCNC: 108 MMOL/L (ref 98–110)
CO2 SERPL-SCNC: 20 MMOL/L (ref 21–32)
CREAT SERPL-MCNC: 0.47 MG/DL (ref 0.66–1.25)
CREAT/UREA NIT SERPL: 21 (ref 12–20)
EOSINOPHIL # BLD AUTO: 0.05 X10(3)/MCL (ref 0.04–0.36)
EOSINOPHIL NFR BLD AUTO: 0.4 % (ref 0.7–7)
ERYTHROCYTE [DISTWIDTH] IN BLOOD BY AUTOMATED COUNT: 12.9 % (ref 11–14.5)
GFR SERPLBLD CREATININE-BSD FMLA CKD-EPI: >90 MLS/MIN/1.73/M2
GLOBULIN SER-MCNC: 3.2 GM/DL (ref 2–3.9)
GLUCOSE SERPL-MCNC: 90 MG/DL (ref 70–115)
HBV SURFACE AG SERPL QL IA: NEGATIVE
HBV SURFACE AG SERPL QL IA: NORMAL
HCT VFR BLD AUTO: 32.7 % (ref 36–48)
HGB BLD-MCNC: 10.7 G/DL (ref 11.8–16)
IMM GRANULOCYTES # BLD AUTO: 0.07 X10(3)/MCL (ref 0–0.03)
IMM GRANULOCYTES NFR BLD AUTO: 0.6 % (ref 0–0.5)
LYMPHOCYTES # BLD AUTO: 2.01 X10(3)/MCL (ref 1.16–3.74)
LYMPHOCYTES NFR BLD AUTO: 18 % (ref 20–55)
MCH RBC QN AUTO: 26.6 PG (ref 27–34)
MCHC RBC AUTO-ENTMCNC: 32.7 G/DL (ref 31–37)
MCV RBC AUTO: 81.3 FL (ref 79–99)
MONOCYTES # BLD AUTO: 0.54 X10(3)/MCL (ref 0.24–0.36)
MONOCYTES NFR BLD AUTO: 4.8 % (ref 4.7–12.5)
NEUTROPHILS # BLD AUTO: 8.46 X10(3)/MCL (ref 1.56–6.13)
NEUTROPHILS NFR BLD AUTO: 76 % (ref 37–73)
NRBC BLD AUTO-RTO: 0 %
PLATELET # BLD AUTO: 211 X10(3)/MCL (ref 140–371)
PMV BLD AUTO: 11.2 FL (ref 9.4–12.4)
POTASSIUM SERPL-SCNC: 4 MMOL/L (ref 3.5–5.1)
PROT SERPL-MCNC: 6.4 GM/DL (ref 6.3–8.2)
RBC # BLD AUTO: 4.02 X10(6)/MCL (ref 4–5.1)
SODIUM SERPL-SCNC: 133 MMOL/L (ref 135–145)
SPECIMEN OUTDATE: NORMAL
WBC # SPEC AUTO: 11.15 X10(3)/MCL (ref 4–11.5)

## 2024-05-02 PROCEDURE — 86780 TREPONEMA PALLIDUM: CPT | Performed by: OBSTETRICS & GYNECOLOGY

## 2024-05-02 PROCEDURE — 11000001 HC ACUTE MED/SURG PRIVATE ROOM

## 2024-05-02 PROCEDURE — 63600175 PHARM REV CODE 636 W HCPCS: Performed by: OBSTETRICS & GYNECOLOGY

## 2024-05-02 PROCEDURE — 80053 COMPREHEN METABOLIC PANEL: CPT | Performed by: OBSTETRICS & GYNECOLOGY

## 2024-05-02 PROCEDURE — 85025 COMPLETE CBC W/AUTO DIFF WBC: CPT | Performed by: OBSTETRICS & GYNECOLOGY

## 2024-05-02 PROCEDURE — 36415 COLL VENOUS BLD VENIPUNCTURE: CPT | Performed by: OBSTETRICS & GYNECOLOGY

## 2024-05-02 PROCEDURE — 51702 INSERT TEMP BLADDER CATH: CPT

## 2024-05-02 PROCEDURE — 59514 CESAREAN DELIVERY ONLY: CPT | Mod: QZ,,, | Performed by: NURSE ANESTHETIST, CERTIFIED REGISTERED

## 2024-05-02 PROCEDURE — 25000003 PHARM REV CODE 250: Performed by: OBSTETRICS & GYNECOLOGY

## 2024-05-02 PROCEDURE — 37000008 HC ANESTHESIA 1ST 15 MINUTES: Performed by: NURSE ANESTHETIST, CERTIFIED REGISTERED

## 2024-05-02 PROCEDURE — 36000708 HC OR TIME LEV III 1ST 15 MIN: Performed by: OBSTETRICS & GYNECOLOGY

## 2024-05-02 PROCEDURE — 36000709 HC OR TIME LEV III EA ADD 15 MIN: Performed by: OBSTETRICS & GYNECOLOGY

## 2024-05-02 PROCEDURE — 87340 HEPATITIS B SURFACE AG IA: CPT | Performed by: OBSTETRICS & GYNECOLOGY

## 2024-05-02 PROCEDURE — 62326 NJX INTERLAMINAR LMBR/SAC: CPT | Performed by: NURSE ANESTHETIST, CERTIFIED REGISTERED

## 2024-05-02 PROCEDURE — 59510 CESAREAN DELIVERY: CPT | Mod: AT,,, | Performed by: OBSTETRICS & GYNECOLOGY

## 2024-05-02 PROCEDURE — 37000009 HC ANESTHESIA EA ADD 15 MINS: Performed by: NURSE ANESTHETIST, CERTIFIED REGISTERED

## 2024-05-02 PROCEDURE — 01968 ANES/ANALG CS DLVR NEURAXIAL: CPT | Mod: QZ,,, | Performed by: NURSE ANESTHETIST, CERTIFIED REGISTERED

## 2024-05-02 PROCEDURE — 71000033 HC RECOVERY, INTIAL HOUR: Performed by: OBSTETRICS & GYNECOLOGY

## 2024-05-02 PROCEDURE — 37000008 HC ANESTHESIA 1ST 15 MINUTES: Performed by: OBSTETRICS & GYNECOLOGY

## 2024-05-02 PROCEDURE — 37000009 HC ANESTHESIA EA ADD 15 MINS: Performed by: OBSTETRICS & GYNECOLOGY

## 2024-05-02 PROCEDURE — 87389 HIV-1 AG W/HIV-1&-2 AB AG IA: CPT | Performed by: OBSTETRICS & GYNECOLOGY

## 2024-05-02 PROCEDURE — 3E0234Z INTRODUCTION OF SERUM, TOXOID AND VACCINE INTO MUSCLE, PERCUTANEOUS APPROACH: ICD-10-PCS | Performed by: OBSTETRICS & GYNECOLOGY

## 2024-05-02 PROCEDURE — 25000003 PHARM REV CODE 250: Performed by: NURSE ANESTHETIST, CERTIFIED REGISTERED

## 2024-05-02 PROCEDURE — 63600175 PHARM REV CODE 636 W HCPCS: Performed by: NURSE ANESTHETIST, CERTIFIED REGISTERED

## 2024-05-02 PROCEDURE — 86901 BLOOD TYPING SEROLOGIC RH(D): CPT | Performed by: OBSTETRICS & GYNECOLOGY

## 2024-05-02 RX ORDER — OXYTOCIN/RINGER'S LACTATE 30/500 ML
0-30 PLASTIC BAG, INJECTION (ML) INTRAVENOUS CONTINUOUS
Status: DISCONTINUED | OUTPATIENT
Start: 2024-05-02 | End: 2024-05-05

## 2024-05-02 RX ORDER — DIPHENOXYLATE HYDROCHLORIDE AND ATROPINE SULFATE 2.5; .025 MG/1; MG/1
2 TABLET ORAL EVERY 6 HOURS PRN
Status: DISCONTINUED | OUTPATIENT
Start: 2024-05-02 | End: 2024-05-03

## 2024-05-02 RX ORDER — BUTORPHANOL TARTRATE 2 MG/ML
1 INJECTION INTRAMUSCULAR; INTRAVENOUS
Status: DISCONTINUED | OUTPATIENT
Start: 2024-05-02 | End: 2024-05-05 | Stop reason: HOSPADM

## 2024-05-02 RX ORDER — LIDOCAINE HYDROCHLORIDE 20 MG/ML
INJECTION, SOLUTION EPIDURAL; INFILTRATION; INTRACAUDAL; PERINEURAL
Status: COMPLETED | OUTPATIENT
Start: 2024-05-02 | End: 2024-05-02

## 2024-05-02 RX ORDER — DIPHENOXYLATE HYDROCHLORIDE AND ATROPINE SULFATE 2.5; .025 MG/1; MG/1
2 TABLET ORAL EVERY 6 HOURS PRN
Status: DISCONTINUED | OUTPATIENT
Start: 2024-05-03 | End: 2024-05-03

## 2024-05-02 RX ORDER — FENTANYL CITRATE 50 UG/ML
INJECTION, SOLUTION INTRAMUSCULAR; INTRAVENOUS
Status: DISCONTINUED | OUTPATIENT
Start: 2024-05-02 | End: 2024-05-02

## 2024-05-02 RX ORDER — MISOPROSTOL 100 UG/1
800 TABLET ORAL ONCE AS NEEDED
Status: DISCONTINUED | OUTPATIENT
Start: 2024-05-02 | End: 2024-05-05

## 2024-05-02 RX ORDER — ONDANSETRON 4 MG/1
8 TABLET, ORALLY DISINTEGRATING ORAL EVERY 8 HOURS PRN
Status: DISCONTINUED | OUTPATIENT
Start: 2024-05-02 | End: 2024-05-03

## 2024-05-02 RX ORDER — CALCIUM CARBONATE 200(500)MG
500 TABLET,CHEWABLE ORAL 3 TIMES DAILY PRN
Status: DISCONTINUED | OUTPATIENT
Start: 2024-05-02 | End: 2024-05-05 | Stop reason: HOSPADM

## 2024-05-02 RX ORDER — LIDOCAINE HYDROCHLORIDE 10 MG/ML
10 INJECTION INFILTRATION; PERINEURAL ONCE AS NEEDED
Status: DISCONTINUED | OUTPATIENT
Start: 2024-05-02 | End: 2024-05-05 | Stop reason: HOSPADM

## 2024-05-02 RX ORDER — LABETALOL HCL 20 MG/4 ML
80 SYRINGE (ML) INTRAVENOUS ONCE AS NEEDED
Status: DISCONTINUED | OUTPATIENT
Start: 2024-05-02 | End: 2024-05-05 | Stop reason: HOSPADM

## 2024-05-02 RX ORDER — METHYLERGONOVINE MALEATE 0.2 MG/ML
200 INJECTION INTRAVENOUS ONCE AS NEEDED
Status: DISCONTINUED | OUTPATIENT
Start: 2024-05-03 | End: 2024-05-05 | Stop reason: HOSPADM

## 2024-05-02 RX ORDER — SODIUM CHLORIDE, SODIUM LACTATE, POTASSIUM CHLORIDE, CALCIUM CHLORIDE 600; 310; 30; 20 MG/100ML; MG/100ML; MG/100ML; MG/100ML
INJECTION, SOLUTION INTRAVENOUS CONTINUOUS
Status: DISCONTINUED | OUTPATIENT
Start: 2024-05-02 | End: 2024-05-05

## 2024-05-02 RX ORDER — CARBOPROST TROMETHAMINE 250 UG/ML
250 INJECTION, SOLUTION INTRAMUSCULAR
Status: DISCONTINUED | OUTPATIENT
Start: 2024-05-03 | End: 2024-05-05 | Stop reason: HOSPADM

## 2024-05-02 RX ORDER — OXYTOCIN 10 [USP'U]/ML
10 INJECTION, SOLUTION INTRAMUSCULAR; INTRAVENOUS ONCE AS NEEDED
Status: DISCONTINUED | OUTPATIENT
Start: 2024-05-02 | End: 2024-05-05 | Stop reason: HOSPADM

## 2024-05-02 RX ORDER — PROCHLORPERAZINE EDISYLATE 5 MG/ML
5 INJECTION INTRAMUSCULAR; INTRAVENOUS EVERY 6 HOURS PRN
Status: DISCONTINUED | OUTPATIENT
Start: 2024-05-03 | End: 2024-05-03

## 2024-05-02 RX ORDER — MAGNESIUM SULFATE HEPTAHYDRATE 40 MG/ML
4 INJECTION, SOLUTION INTRAVENOUS ONCE
Status: COMPLETED | OUTPATIENT
Start: 2024-05-02 | End: 2024-05-02

## 2024-05-02 RX ORDER — OXYTOCIN/RINGER'S LACTATE 30/500 ML
334 PLASTIC BAG, INJECTION (ML) INTRAVENOUS ONCE AS NEEDED
Status: DISCONTINUED | OUTPATIENT
Start: 2024-05-02 | End: 2024-05-05 | Stop reason: HOSPADM

## 2024-05-02 RX ORDER — LIDOCAINE HCL/EPINEPHRINE/PF 2%-1:200K
1 VIAL (ML) INJECTION ONCE
Status: DISCONTINUED | OUTPATIENT
Start: 2024-05-02 | End: 2024-05-05 | Stop reason: HOSPADM

## 2024-05-02 RX ORDER — OXYTOCIN/RINGER'S LACTATE 30/500 ML
95 PLASTIC BAG, INJECTION (ML) INTRAVENOUS ONCE
Status: DISCONTINUED | OUTPATIENT
Start: 2024-05-02 | End: 2024-05-05 | Stop reason: HOSPADM

## 2024-05-02 RX ORDER — OXYTOCIN/RINGER'S LACTATE 30/500 ML
95 PLASTIC BAG, INJECTION (ML) INTRAVENOUS ONCE AS NEEDED
Status: DISCONTINUED | OUTPATIENT
Start: 2024-05-02 | End: 2024-05-05 | Stop reason: HOSPADM

## 2024-05-02 RX ORDER — LABETALOL HCL 20 MG/4 ML
40 SYRINGE (ML) INTRAVENOUS ONCE AS NEEDED
Status: DISCONTINUED | OUTPATIENT
Start: 2024-05-02 | End: 2024-05-05 | Stop reason: HOSPADM

## 2024-05-02 RX ORDER — OXYTOCIN/RINGER'S LACTATE 30/500 ML
334 PLASTIC BAG, INJECTION (ML) INTRAVENOUS ONCE AS NEEDED
Status: DISCONTINUED | OUTPATIENT
Start: 2024-05-02 | End: 2024-05-05

## 2024-05-02 RX ORDER — CARBOPROST TROMETHAMINE 250 UG/ML
250 INJECTION, SOLUTION INTRAMUSCULAR
Status: DISCONTINUED | OUTPATIENT
Start: 2024-05-02 | End: 2024-05-05

## 2024-05-02 RX ORDER — SODIUM CHLORIDE 9 MG/ML
INJECTION, SOLUTION INTRAVENOUS
Status: DISCONTINUED | OUTPATIENT
Start: 2024-05-02 | End: 2024-05-05

## 2024-05-02 RX ORDER — SODIUM CHLORIDE 0.9 % (FLUSH) 0.9 %
10 SYRINGE (ML) INJECTION
Status: DISCONTINUED | OUTPATIENT
Start: 2024-05-03 | End: 2024-05-05 | Stop reason: HOSPADM

## 2024-05-02 RX ORDER — ACETAMINOPHEN 325 MG/1
650 TABLET ORAL EVERY 6 HOURS PRN
Status: DISCONTINUED | OUTPATIENT
Start: 2024-05-02 | End: 2024-05-05

## 2024-05-02 RX ORDER — TRANEXAMIC ACID 10 MG/ML
1000 INJECTION, SOLUTION INTRAVENOUS EVERY 30 MIN PRN
Status: DISCONTINUED | OUTPATIENT
Start: 2024-05-02 | End: 2024-05-05

## 2024-05-02 RX ORDER — SIMETHICONE 80 MG
1 TABLET,CHEWABLE ORAL 4 TIMES DAILY PRN
Status: DISCONTINUED | OUTPATIENT
Start: 2024-05-02 | End: 2024-05-03

## 2024-05-02 RX ORDER — OXYTOCIN/RINGER'S LACTATE 30/500 ML
95 PLASTIC BAG, INJECTION (ML) INTRAVENOUS ONCE AS NEEDED
Status: DISCONTINUED | OUTPATIENT
Start: 2024-05-03 | End: 2024-05-05

## 2024-05-02 RX ORDER — ONDANSETRON 4 MG/1
8 TABLET, ORALLY DISINTEGRATING ORAL EVERY 8 HOURS PRN
Status: DISCONTINUED | OUTPATIENT
Start: 2024-05-03 | End: 2024-05-03

## 2024-05-02 RX ORDER — MAGNESIUM SULFATE HEPTAHYDRATE 40 MG/ML
2 INJECTION, SOLUTION INTRAVENOUS CONTINUOUS
Status: DISCONTINUED | OUTPATIENT
Start: 2024-05-02 | End: 2024-05-05

## 2024-05-02 RX ORDER — TERBUTALINE SULFATE 1 MG/ML
INJECTION SUBCUTANEOUS
Status: DISPENSED
Start: 2024-05-02 | End: 2024-05-03

## 2024-05-02 RX ORDER — ROPIVACAINE HYDROCHLORIDE 2 MG/ML
12 INJECTION, SOLUTION EPIDURAL; INFILTRATION CONTINUOUS
Status: DISCONTINUED | OUTPATIENT
Start: 2024-05-02 | End: 2024-05-05

## 2024-05-02 RX ORDER — TRANEXAMIC ACID 10 MG/ML
1000 INJECTION, SOLUTION INTRAVENOUS EVERY 30 MIN PRN
Status: DISCONTINUED | OUTPATIENT
Start: 2024-05-02 | End: 2024-05-05 | Stop reason: HOSPADM

## 2024-05-02 RX ORDER — OXYTOCIN/RINGER'S LACTATE 30/500 ML
334 PLASTIC BAG, INJECTION (ML) INTRAVENOUS ONCE
Status: DISCONTINUED | OUTPATIENT
Start: 2024-05-02 | End: 2024-05-05 | Stop reason: HOSPADM

## 2024-05-02 RX ORDER — METHYLERGONOVINE MALEATE 0.2 MG/ML
200 INJECTION INTRAVENOUS ONCE AS NEEDED
Status: DISCONTINUED | OUTPATIENT
Start: 2024-05-02 | End: 2024-05-05

## 2024-05-02 RX ORDER — OXYTOCIN 10 [USP'U]/ML
10 INJECTION, SOLUTION INTRAMUSCULAR; INTRAVENOUS ONCE AS NEEDED
Status: DISCONTINUED | OUTPATIENT
Start: 2024-05-03 | End: 2024-05-05

## 2024-05-02 RX ORDER — CEFAZOLIN SODIUM 2 G/50ML
2 SOLUTION INTRAVENOUS ONCE AS NEEDED
Status: DISCONTINUED | OUTPATIENT
Start: 2024-05-02 | End: 2024-05-02 | Stop reason: CLARIF

## 2024-05-02 RX ORDER — CALCIUM GLUCONATE 98 MG/ML
1 INJECTION, SOLUTION INTRAVENOUS
Status: DISCONTINUED | OUTPATIENT
Start: 2024-05-02 | End: 2024-05-05 | Stop reason: HOSPADM

## 2024-05-02 RX ORDER — MAGNESIUM SULFATE HEPTAHYDRATE 40 MG/ML
INJECTION, SOLUTION INTRAVENOUS
Status: DISPENSED
Start: 2024-05-02 | End: 2024-05-03

## 2024-05-02 RX ORDER — PROCHLORPERAZINE EDISYLATE 5 MG/ML
5 INJECTION INTRAMUSCULAR; INTRAVENOUS EVERY 6 HOURS PRN
Status: DISCONTINUED | OUTPATIENT
Start: 2024-05-02 | End: 2024-05-03

## 2024-05-02 RX ORDER — MISOPROSTOL 100 UG/1
800 TABLET ORAL ONCE AS NEEDED
Status: DISCONTINUED | OUTPATIENT
Start: 2024-05-03 | End: 2024-05-05 | Stop reason: HOSPADM

## 2024-05-02 RX ORDER — TERBUTALINE SULFATE 1 MG/ML
0.25 INJECTION SUBCUTANEOUS
Status: DISCONTINUED | OUTPATIENT
Start: 2024-05-02 | End: 2024-05-05 | Stop reason: HOSPADM

## 2024-05-02 RX ORDER — MISOPROSTOL 100 MCG
25 TABLET ORAL EVERY 4 HOURS PRN
Status: DISCONTINUED | OUTPATIENT
Start: 2024-05-02 | End: 2024-05-03

## 2024-05-02 RX ORDER — LABETALOL HCL 20 MG/4 ML
SYRINGE (ML) INTRAVENOUS
Status: DISPENSED
Start: 2024-05-02 | End: 2024-05-03

## 2024-05-02 RX ORDER — PENICILLIN G 3000000 [IU]/50ML
3 INJECTION, SOLUTION INTRAVENOUS
Status: DISCONTINUED | OUTPATIENT
Start: 2024-05-02 | End: 2024-05-03

## 2024-05-02 RX ORDER — LABETALOL HCL 20 MG/4 ML
20 SYRINGE (ML) INTRAVENOUS ONCE AS NEEDED
Status: COMPLETED | OUTPATIENT
Start: 2024-05-02 | End: 2024-05-02

## 2024-05-02 RX ORDER — BUTORPHANOL TARTRATE 2 MG/ML
2 INJECTION INTRAMUSCULAR; INTRAVENOUS
Status: DISCONTINUED | OUTPATIENT
Start: 2024-05-02 | End: 2024-05-03

## 2024-05-02 RX ORDER — ALUMINUM HYDROXIDE, MAGNESIUM HYDROXIDE, AND SIMETHICONE 1200; 120; 1200 MG/30ML; MG/30ML; MG/30ML
30 SUSPENSION ORAL EVERY 6 HOURS PRN
Status: DISCONTINUED | OUTPATIENT
Start: 2024-05-03 | End: 2024-05-03

## 2024-05-02 RX ORDER — ONDANSETRON HYDROCHLORIDE 2 MG/ML
INJECTION, SOLUTION INTRAVENOUS
Status: DISCONTINUED | OUTPATIENT
Start: 2024-05-02 | End: 2024-05-02

## 2024-05-02 RX ORDER — HYDRALAZINE HYDROCHLORIDE 20 MG/ML
10 INJECTION INTRAMUSCULAR; INTRAVENOUS ONCE AS NEEDED
Status: DISCONTINUED | OUTPATIENT
Start: 2024-05-02 | End: 2024-05-05 | Stop reason: HOSPADM

## 2024-05-02 RX ORDER — MISOPROSTOL 100 UG/1
800 TABLET ORAL ONCE AS NEEDED
Status: DISCONTINUED | OUTPATIENT
Start: 2024-05-02 | End: 2024-05-05 | Stop reason: HOSPADM

## 2024-05-02 RX ORDER — MUPIROCIN 20 MG/G
OINTMENT TOPICAL
Status: DISCONTINUED | OUTPATIENT
Start: 2024-05-02 | End: 2024-05-05

## 2024-05-02 RX ORDER — CEFAZOLIN SODIUM 2 G/50ML
SOLUTION INTRAVENOUS
Status: DISPENSED
Start: 2024-05-02 | End: 2024-05-03

## 2024-05-02 RX ADMIN — TERBUTALINE SULFATE 0.25 MG: 1 INJECTION, SOLUTION SUBCUTANEOUS at 10:05

## 2024-05-02 RX ADMIN — SODIUM CHLORIDE, POTASSIUM CHLORIDE, SODIUM LACTATE AND CALCIUM CHLORIDE 1000 ML: 600; 310; 30; 20 INJECTION, SOLUTION INTRAVENOUS at 05:05

## 2024-05-02 RX ADMIN — ROPIVACAINE HYDROCHLORIDE 12 ML/HR: 2 INJECTION, SOLUTION EPIDURAL; INFILTRATION at 05:05

## 2024-05-02 RX ADMIN — CEFAZOLIN 2 G: 1 INJECTION, POWDER, FOR SOLUTION INTRAMUSCULAR; INTRAVENOUS at 10:05

## 2024-05-02 RX ADMIN — TRANEXAMIC ACID 1000 MG: 10 INJECTION, SOLUTION INTRAVENOUS at 10:05

## 2024-05-02 RX ADMIN — SODIUM CHLORIDE, POTASSIUM CHLORIDE, SODIUM LACTATE AND CALCIUM CHLORIDE: 600; 310; 30; 20 INJECTION, SOLUTION INTRAVENOUS at 10:05

## 2024-05-02 RX ADMIN — LIDOCAINE HYDROCHLORIDE 5 ML: 20 INJECTION, SOLUTION EPIDURAL; INFILTRATION; INTRACAUDAL; PERINEURAL at 05:05

## 2024-05-02 RX ADMIN — ONDANSETRON 4 MG: 2 INJECTION INTRAMUSCULAR; INTRAVENOUS at 10:05

## 2024-05-02 RX ADMIN — MAGNESIUM SULFATE HEPTAHYDRATE 4 G: 40 INJECTION, SOLUTION INTRAVENOUS at 05:05

## 2024-05-02 RX ADMIN — Medication 4 MILLI-UNITS/MIN: at 06:05

## 2024-05-02 RX ADMIN — MAGNESIUM SULFATE HEPTAHYDRATE 2 G/HR: 40 INJECTION, SOLUTION INTRAVENOUS at 05:05

## 2024-05-02 RX ADMIN — FENTANYL CITRATE 100 MCG: 50 INJECTION, SOLUTION INTRAMUSCULAR; INTRAVENOUS at 10:05

## 2024-05-02 RX ADMIN — FENTANYL CITRATE 100 MCG: 50 INJECTION, SOLUTION INTRAMUSCULAR; INTRAVENOUS at 11:05

## 2024-05-02 RX ADMIN — LABETALOL HYDROCHLORIDE 20 MG: 5 INJECTION, SOLUTION INTRAVENOUS at 05:05

## 2024-05-02 NOTE — ANESTHESIA PROCEDURE NOTES
Epidural    Patient location during procedure: OB   Reason for block: primary anesthetic   Reason for block: at surgeon's request, post-op pain management  Diagnosis: Active Labor   Start time: 5/2/2024 5:10 PM  Timeout: 5/2/2024 5:10 PM  End time: 5/2/2024 5:20 PM  Surgery related to: vaginal delivery    Staffing  Performing Provider: Jose Luna CRNA  Authorizing Provider: Jose Luna CRNA    Staffing  Performed by: Jose Luna CRNA  Authorized by: Jose Luna CRNA        Preanesthetic Checklist  Completed: patient identified, IV checked, site marked, surgical consent, monitors and equipment checked, pre-op evaluation, timeout performed, anesthesia consent given, hand hygiene performed and patient being monitored  Preparation  Patient position: sitting  Prep: ChloraPrep  Reason for block: primary anesthetic   Epidural  Skin Anesthetic: lidocaine 1%  Administration type: single shot  Approach: midline  Interspace: L3-4    Block type: caudal.  Needle and Epidural Catheter  Needle type: Tuohy   Needle gauge: 18  Needle length: 5.0 inches  Catheter type: multi-orifice  Catheter size: 20 G  Insertion Attempts: 1  Test dose: 3 mL of lidocaine 1.5% with Epi 1-to-200,000  Additional Documentation: incremental injection, negative aspiration for heme and CSF and no paresthesia on injection  Needle localization: anatomical landmarks  Assessment  Ease of block: easy  Patient's tolerance of the procedure: comfortable throughout block No inadvertent dural puncture with Tuohy.  Dural puncture not performed with spinal needle    Medications:    Medications: lidocaine (PF) 20 mg/mL (2%) injection - Epidural   5 mL - 5/2/2024 5:16:00 PM

## 2024-05-02 NOTE — ANESTHESIA PREPROCEDURE EVALUATION
05/02/2024  Rachael Corrales is a 38 y.o., female.      Pre-op Assessment    I have reviewed the Patient Summary Reports.     I have reviewed the Nursing Notes. I have reviewed the NPO Status.   I have reviewed the Medications.     Review of Systems  Anesthesia Hx:  No problems with previous Anesthesia             Denies Family Hx of Anesthesia complications.    Denies Personal Hx of Anesthesia complications.                    Hematology/Oncology:  Hematology Normal   Oncology Normal                                   EENT/Dental:  EENT/Dental Normal           Cardiovascular:  Cardiovascular Normal Exercise tolerance: good                                           Pulmonary:  Pulmonary Normal                       Renal/:  Renal/ Normal                 Hepatic/GI:  Hepatic/GI Normal                 Musculoskeletal:  Musculoskeletal Normal                OB/GYN/PEDS:    Planned Vaginal Delivery Normal without problems on previous vaginal deliveries.                    Neurological:  Neurology Normal                                      Endocrine:  Endocrine Normal            Dermatological:  Skin Normal    Psych:  Psychiatric History anxiety                 Physical Exam  General: Well nourished, Cooperative, Alert and Oriented    Airway:  Mallampati: II / II  Mouth Opening: Normal  TM Distance: Normal  Tongue: Normal  Neck ROM: Normal ROM    Dental:  Intact        Anesthesia Plan  Type of Anesthesia, risks & benefits discussed:    Anesthesia Type: Epidural  Intra-op Monitoring Plan: Standard ASA Monitors  Post Op Pain Control Plan: multimodal analgesia  Induction:  IV  Airway Plan: Direct  Informed Consent: Informed consent signed with the Patient and all parties understand the risks and agree with anesthesia plan.  All questions answered. Patient consented to blood products? Yes  ASA Score: 2  Day of  Surgery Review of History & Physical: H&P Update referred to the surgeon/provider.I have interviewed and examined the patient. I have reviewed the patient's H&P dated: There are no significant changes.     Ready For Surgery From Anesthesia Perspective.     .

## 2024-05-02 NOTE — NURSING
DR. HOYT NOTIFIED OF 2 SEVERE BLOOD PRESSURES.AT 1654 186/109 AND 1710 182/115. ORDERS NOTED TO START MAGNESIUM 4 GRAM LOADING DOSE NOW AND THEN START MAGNESIUM 2 GRAM/HR ALONG WITH LABETALOL PROTOCOL.

## 2024-05-03 LAB
ABS NEUT CALC (OHS): 23.1 X10(3)/MCL (ref 2.1–9.2)
BASOPHILS # BLD AUTO: 0.02 X10(3)/MCL (ref 0.01–0.08)
BASOPHILS NFR BLD AUTO: 0.1 % (ref 0.1–1.2)
EOSINOPHIL # BLD AUTO: 0.01 X10(3)/MCL (ref 0.04–0.36)
EOSINOPHIL NFR BLD AUTO: 0.1 % (ref 0.7–7)
ERYTHROCYTE [DISTWIDTH] IN BLOOD BY AUTOMATED COUNT: 13.2 % (ref 11–14.5)
ERYTHROCYTE [DISTWIDTH] IN BLOOD BY AUTOMATED COUNT: 13.4 % (ref 11–14.5)
HCT VFR BLD AUTO: 28 % (ref 36–48)
HCT VFR BLD AUTO: 30.2 % (ref 36–48)
HGB BLD-MCNC: 10 G/DL (ref 11.8–16)
HGB BLD-MCNC: 9.2 G/DL (ref 11.8–16)
HIV 1+2 AB+HIV1 P24 AG SERPL QL IA: NONREACTIVE
IMM GRANULOCYTES # BLD AUTO: 0.11 X10(3)/MCL (ref 0–0.03)
IMM GRANULOCYTES NFR BLD AUTO: 0.6 % (ref 0–0.5)
LYMPHOCYTES # BLD AUTO: 1.88 X10(3)/MCL (ref 1.16–3.74)
LYMPHOCYTES NFR BLD AUTO: 10.4 % (ref 20–55)
LYMPHOCYTES NFR BLD MANUAL: 1.78 X10(3)/MCL
LYMPHOCYTES NFR BLD MANUAL: 7 % (ref 20–55)
MAGNESIUM SERPL-MCNC: 4.4 MG/DL (ref 1.8–2.4)
MAGNESIUM SERPL-MCNC: 4.8 MG/DL (ref 1.8–2.4)
MAGNESIUM SERPL-MCNC: 5.2 MG/DL (ref 1.8–2.4)
MCH RBC QN AUTO: 26.7 PG (ref 27–34)
MCH RBC QN AUTO: 27 PG (ref 27–34)
MCHC RBC AUTO-ENTMCNC: 32.9 G/DL (ref 31–37)
MCHC RBC AUTO-ENTMCNC: 33.1 G/DL (ref 31–37)
MCV RBC AUTO: 81.2 FL (ref 79–99)
MCV RBC AUTO: 81.4 FL (ref 79–99)
MONOCYTES # BLD AUTO: 1.07 X10(3)/MCL (ref 0.24–0.36)
MONOCYTES NFR BLD AUTO: 5.9 % (ref 4.7–12.5)
MONOCYTES NFR BLD MANUAL: 0.51 X10(3)/MCL (ref 0.1–1.3)
MONOCYTES NFR BLD MANUAL: 2 % (ref 0–10)
NEUTROPHILS # BLD AUTO: 15.01 X10(3)/MCL (ref 1.56–6.13)
NEUTROPHILS NFR BLD AUTO: 82.9 % (ref 37–73)
NEUTROPHILS NFR BLD MANUAL: 91 % (ref 37–73)
NRBC BLD AUTO-RTO: 0 %
NRBC BLD AUTO-RTO: 0 %
PLATELET # BLD AUTO: 215 X10(3)/MCL (ref 140–371)
PLATELET # BLD AUTO: 222 X10(3)/MCL (ref 140–371)
PLATELET # BLD EST: NORMAL 10*3/UL
PMV BLD AUTO: 11.2 FL (ref 9.4–12.4)
PMV BLD AUTO: 11.3 FL (ref 9.4–12.4)
RBC # BLD AUTO: 3.45 X10(6)/MCL (ref 4–5.1)
RBC # BLD AUTO: 3.71 X10(6)/MCL (ref 4–5.1)
RBC MORPH BLD: NORMAL
RH IMMUNE GLOBULIN: NORMAL
ROSETTE - FMH (FETAL BLEED SCREEN): NORMAL
T PALLIDUM AB SER QL: NONREACTIVE
WBC # SPEC AUTO: 18.1 X10(3)/MCL (ref 4–11.5)
WBC # SPEC AUTO: 25.39 X10(3)/MCL (ref 4–11.5)

## 2024-05-03 PROCEDURE — 25000003 PHARM REV CODE 250: Performed by: OBSTETRICS & GYNECOLOGY

## 2024-05-03 PROCEDURE — 83735 ASSAY OF MAGNESIUM: CPT | Performed by: OBSTETRICS & GYNECOLOGY

## 2024-05-03 PROCEDURE — 63600175 PHARM REV CODE 636 W HCPCS: Performed by: OBSTETRICS & GYNECOLOGY

## 2024-05-03 PROCEDURE — 36415 COLL VENOUS BLD VENIPUNCTURE: CPT | Performed by: OBSTETRICS & GYNECOLOGY

## 2024-05-03 PROCEDURE — 85027 COMPLETE CBC AUTOMATED: CPT | Performed by: OBSTETRICS & GYNECOLOGY

## 2024-05-03 PROCEDURE — 11000001 HC ACUTE MED/SURG PRIVATE ROOM

## 2024-05-03 PROCEDURE — 63600175 PHARM REV CODE 636 W HCPCS: Mod: JZ,JG | Performed by: OBSTETRICS & GYNECOLOGY

## 2024-05-03 PROCEDURE — 63600175 PHARM REV CODE 636 W HCPCS

## 2024-05-03 PROCEDURE — 85461 HEMOGLOBIN FETAL: CPT | Performed by: OBSTETRICS & GYNECOLOGY

## 2024-05-03 PROCEDURE — 63600519 RHOGAM PHARM REV CODE 636 ALT 250 W HCPCS: Performed by: OBSTETRICS & GYNECOLOGY

## 2024-05-03 RX ORDER — MUPIROCIN 20 MG/G
OINTMENT TOPICAL 2 TIMES DAILY
Status: DISCONTINUED | OUTPATIENT
Start: 2024-05-03 | End: 2024-05-03

## 2024-05-03 RX ORDER — PRENATAL WITH FERROUS FUM AND FOLIC ACID 3080; 920; 120; 400; 22; 1.84; 3; 20; 10; 1; 12; 200; 27; 25; 2 [IU]/1; [IU]/1; MG/1; [IU]/1; MG/1; MG/1; MG/1; MG/1; MG/1; MG/1; UG/1; MG/1; MG/1; MG/1; MG/1
1 TABLET ORAL DAILY
Status: DISCONTINUED | OUTPATIENT
Start: 2024-05-03 | End: 2024-05-05 | Stop reason: HOSPADM

## 2024-05-03 RX ORDER — OXYTOCIN/RINGER'S LACTATE 30/500 ML
95 PLASTIC BAG, INJECTION (ML) INTRAVENOUS ONCE
Status: DISCONTINUED | OUTPATIENT
Start: 2024-05-03 | End: 2024-05-05 | Stop reason: HOSPADM

## 2024-05-03 RX ORDER — ACETAMINOPHEN 10 MG/ML
1000 INJECTION, SOLUTION INTRAVENOUS ONCE
Status: COMPLETED | OUTPATIENT
Start: 2024-05-03 | End: 2024-05-03

## 2024-05-03 RX ORDER — METHYLERGONOVINE MALEATE 0.2 MG/ML
200 INJECTION INTRAVENOUS ONCE AS NEEDED
Status: DISCONTINUED | OUTPATIENT
Start: 2024-05-03 | End: 2024-05-05

## 2024-05-03 RX ORDER — SODIUM CHLORIDE 0.9 % (FLUSH) 0.9 %
3 SYRINGE (ML) INJECTION
Status: DISCONTINUED | OUTPATIENT
Start: 2024-05-03 | End: 2024-05-05 | Stop reason: HOSPADM

## 2024-05-03 RX ORDER — PROCHLORPERAZINE EDISYLATE 5 MG/ML
5 INJECTION INTRAMUSCULAR; INTRAVENOUS EVERY 6 HOURS PRN
Status: DISCONTINUED | OUTPATIENT
Start: 2024-05-03 | End: 2024-05-05 | Stop reason: HOSPADM

## 2024-05-03 RX ORDER — IBUPROFEN 600 MG/1
600 TABLET ORAL EVERY 6 HOURS
Status: DISCONTINUED | OUTPATIENT
Start: 2024-05-03 | End: 2024-05-05 | Stop reason: HOSPADM

## 2024-05-03 RX ORDER — HYDROCORTISONE 25 MG/G
CREAM TOPICAL 3 TIMES DAILY PRN
Status: DISCONTINUED | OUTPATIENT
Start: 2024-05-03 | End: 2024-05-03

## 2024-05-03 RX ORDER — DOCUSATE SODIUM 100 MG/1
200 CAPSULE, LIQUID FILLED ORAL 2 TIMES DAILY PRN
Status: DISCONTINUED | OUTPATIENT
Start: 2024-05-03 | End: 2024-05-05 | Stop reason: HOSPADM

## 2024-05-03 RX ORDER — AMOXICILLIN 250 MG
1 CAPSULE ORAL NIGHTLY PRN
Status: DISCONTINUED | OUTPATIENT
Start: 2024-05-04 | End: 2024-05-05 | Stop reason: HOSPADM

## 2024-05-03 RX ORDER — OXYCODONE AND ACETAMINOPHEN 10; 325 MG/1; MG/1
1 TABLET ORAL EVERY 4 HOURS PRN
Status: DISCONTINUED | OUTPATIENT
Start: 2024-05-03 | End: 2024-05-05 | Stop reason: HOSPADM

## 2024-05-03 RX ORDER — DIPHENHYDRAMINE HCL 25 MG
25 CAPSULE ORAL EVERY 4 HOURS PRN
Status: DISCONTINUED | OUTPATIENT
Start: 2024-05-04 | End: 2024-05-05 | Stop reason: HOSPADM

## 2024-05-03 RX ORDER — DOCUSATE SODIUM 100 MG/1
200 CAPSULE, LIQUID FILLED ORAL 2 TIMES DAILY
Status: DISCONTINUED | OUTPATIENT
Start: 2024-05-03 | End: 2024-05-05 | Stop reason: HOSPADM

## 2024-05-03 RX ORDER — ACETAMINOPHEN 325 MG/1
650 TABLET ORAL EVERY 6 HOURS PRN
Status: DISCONTINUED | OUTPATIENT
Start: 2024-05-03 | End: 2024-05-05 | Stop reason: HOSPADM

## 2024-05-03 RX ORDER — HYDROMORPHONE HYDROCHLORIDE 1 MG/ML
1 INJECTION, SOLUTION INTRAMUSCULAR; INTRAVENOUS; SUBCUTANEOUS EVERY 4 HOURS PRN
Status: DISCONTINUED | OUTPATIENT
Start: 2024-05-04 | End: 2024-05-05 | Stop reason: HOSPADM

## 2024-05-03 RX ORDER — HYDROMORPHONE HYDROCHLORIDE 1 MG/ML
1 INJECTION, SOLUTION INTRAMUSCULAR; INTRAVENOUS; SUBCUTANEOUS EVERY 6 HOURS PRN
Status: DISCONTINUED | OUTPATIENT
Start: 2024-05-03 | End: 2024-05-03

## 2024-05-03 RX ORDER — ADHESIVE BANDAGE
30 BANDAGE TOPICAL 2 TIMES DAILY PRN
Status: DISCONTINUED | OUTPATIENT
Start: 2024-05-04 | End: 2024-05-05 | Stop reason: HOSPADM

## 2024-05-03 RX ORDER — TRANEXAMIC ACID 10 MG/ML
1000 INJECTION, SOLUTION INTRAVENOUS EVERY 30 MIN PRN
Status: DISCONTINUED | OUTPATIENT
Start: 2024-05-03 | End: 2024-05-05

## 2024-05-03 RX ORDER — LANOLIN ALCOHOL/MO/W.PET/CERES
1 CREAM (GRAM) TOPICAL DAILY
Status: DISCONTINUED | OUTPATIENT
Start: 2024-05-03 | End: 2024-05-05 | Stop reason: HOSPADM

## 2024-05-03 RX ORDER — OXYTOCIN 10 [USP'U]/ML
10 INJECTION, SOLUTION INTRAMUSCULAR; INTRAVENOUS ONCE AS NEEDED
Status: DISCONTINUED | OUTPATIENT
Start: 2024-05-03 | End: 2024-05-05

## 2024-05-03 RX ORDER — SIMETHICONE 80 MG
1 TABLET,CHEWABLE ORAL EVERY 6 HOURS PRN
Status: DISCONTINUED | OUTPATIENT
Start: 2024-05-04 | End: 2024-05-05 | Stop reason: HOSPADM

## 2024-05-03 RX ORDER — OXYTOCIN/RINGER'S LACTATE 30/500 ML
334 PLASTIC BAG, INJECTION (ML) INTRAVENOUS ONCE AS NEEDED
Status: DISCONTINUED | OUTPATIENT
Start: 2024-05-03 | End: 2024-05-05

## 2024-05-03 RX ORDER — CARBOPROST TROMETHAMINE 250 UG/ML
250 INJECTION, SOLUTION INTRAMUSCULAR
Status: DISCONTINUED | OUTPATIENT
Start: 2024-05-03 | End: 2024-05-05

## 2024-05-03 RX ORDER — MISOPROSTOL 100 UG/1
800 TABLET ORAL ONCE AS NEEDED
Status: DISCONTINUED | OUTPATIENT
Start: 2024-05-03 | End: 2024-05-05

## 2024-05-03 RX ORDER — PRENATAL WITH FERROUS FUM AND FOLIC ACID 3080; 920; 120; 400; 22; 1.84; 3; 20; 10; 1; 12; 200; 27; 25; 2 [IU]/1; [IU]/1; MG/1; [IU]/1; MG/1; MG/1; MG/1; MG/1; MG/1; MG/1; UG/1; MG/1; MG/1; MG/1; MG/1
1 TABLET ORAL DAILY
Status: DISCONTINUED | OUTPATIENT
Start: 2024-05-03 | End: 2024-05-03

## 2024-05-03 RX ORDER — OXYTOCIN/RINGER'S LACTATE 30/500 ML
95 PLASTIC BAG, INJECTION (ML) INTRAVENOUS ONCE AS NEEDED
Status: DISCONTINUED | OUTPATIENT
Start: 2024-05-03 | End: 2024-05-05

## 2024-05-03 RX ORDER — BISACODYL 10 MG/1
10 SUPPOSITORY RECTAL ONCE AS NEEDED
Status: DISCONTINUED | OUTPATIENT
Start: 2024-05-04 | End: 2024-05-05 | Stop reason: HOSPADM

## 2024-05-03 RX ORDER — HYDROCODONE BITARTRATE AND ACETAMINOPHEN 7.5; 325 MG/1; MG/1
1 TABLET ORAL EVERY 4 HOURS PRN
Status: DISCONTINUED | OUTPATIENT
Start: 2024-05-03 | End: 2024-05-03

## 2024-05-03 RX ORDER — SIMETHICONE 80 MG
1 TABLET,CHEWABLE ORAL EVERY 6 HOURS PRN
Status: DISCONTINUED | OUTPATIENT
Start: 2024-05-03 | End: 2024-05-03

## 2024-05-03 RX ORDER — HYDROCORTISONE 25 MG/G
CREAM TOPICAL 3 TIMES DAILY PRN
Status: DISCONTINUED | OUTPATIENT
Start: 2024-05-04 | End: 2024-05-05 | Stop reason: HOSPADM

## 2024-05-03 RX ORDER — DIPHENHYDRAMINE HCL 25 MG
25 CAPSULE ORAL EVERY 4 HOURS PRN
Status: DISCONTINUED | OUTPATIENT
Start: 2024-05-03 | End: 2024-05-03

## 2024-05-03 RX ORDER — DIPHENHYDRAMINE HYDROCHLORIDE 50 MG/ML
25 INJECTION INTRAMUSCULAR; INTRAVENOUS EVERY 4 HOURS PRN
Status: DISCONTINUED | OUTPATIENT
Start: 2024-05-03 | End: 2024-05-05 | Stop reason: HOSPADM

## 2024-05-03 RX ORDER — OXYTOCIN/RINGER'S LACTATE 30/500 ML
95 PLASTIC BAG, INJECTION (ML) INTRAVENOUS ONCE
Status: DISCONTINUED | OUTPATIENT
Start: 2024-05-04 | End: 2024-05-05 | Stop reason: HOSPADM

## 2024-05-03 RX ORDER — ADHESIVE BANDAGE
30 BANDAGE TOPICAL NIGHTLY PRN
Status: DISCONTINUED | OUTPATIENT
Start: 2024-05-03 | End: 2024-05-03

## 2024-05-03 RX ORDER — DIPHENOXYLATE HYDROCHLORIDE AND ATROPINE SULFATE 2.5; .025 MG/1; MG/1
2 TABLET ORAL EVERY 6 HOURS PRN
Status: DISCONTINUED | OUTPATIENT
Start: 2024-05-03 | End: 2024-05-05 | Stop reason: HOSPADM

## 2024-05-03 RX ORDER — HYDROCODONE BITARTRATE AND ACETAMINOPHEN 7.5; 325 MG/1; MG/1
1 TABLET ORAL EVERY 4 HOURS PRN
Status: DISCONTINUED | OUTPATIENT
Start: 2024-05-04 | End: 2024-05-05 | Stop reason: HOSPADM

## 2024-05-03 RX ORDER — OXYCODONE AND ACETAMINOPHEN 10; 325 MG/1; MG/1
1 TABLET ORAL EVERY 4 HOURS PRN
Status: DISCONTINUED | OUTPATIENT
Start: 2024-05-04 | End: 2024-05-03

## 2024-05-03 RX ORDER — ALUMINUM HYDROXIDE, MAGNESIUM HYDROXIDE, AND SIMETHICONE 1200; 120; 1200 MG/30ML; MG/30ML; MG/30ML
30 SUSPENSION ORAL EVERY 6 HOURS PRN
Status: DISCONTINUED | OUTPATIENT
Start: 2024-05-03 | End: 2024-05-05 | Stop reason: HOSPADM

## 2024-05-03 RX ORDER — HYDROMORPHONE HYDROCHLORIDE 1 MG/ML
INJECTION, SOLUTION INTRAMUSCULAR; INTRAVENOUS; SUBCUTANEOUS
Status: COMPLETED
Start: 2024-05-03 | End: 2024-05-03

## 2024-05-03 RX ORDER — BISACODYL 10 MG/1
10 SUPPOSITORY RECTAL DAILY PRN
Status: DISCONTINUED | OUTPATIENT
Start: 2024-05-03 | End: 2024-05-05 | Stop reason: HOSPADM

## 2024-05-03 RX ORDER — ONDANSETRON 4 MG/1
8 TABLET, ORALLY DISINTEGRATING ORAL EVERY 8 HOURS PRN
Status: DISCONTINUED | OUTPATIENT
Start: 2024-05-03 | End: 2024-05-05 | Stop reason: HOSPADM

## 2024-05-03 RX ADMIN — IBUPROFEN 600 MG: 600 TABLET, FILM COATED ORAL at 11:05

## 2024-05-03 RX ADMIN — PRENATAL VITAMINS-IRON FUMARATE 27 MG IRON-FOLIC ACID 0.8 MG TABLET 1 TABLET: at 09:05

## 2024-05-03 RX ADMIN — HUMAN RHO(D) IMMUNE GLOBULIN 300 MCG: 1500 SOLUTION INTRAMUSCULAR; INTRAVENOUS at 06:05

## 2024-05-03 RX ADMIN — ACETAMINOPHEN 1000 MG: 10 INJECTION INTRAVENOUS at 12:05

## 2024-05-03 RX ADMIN — DOCUSATE SODIUM 200 MG: 100 CAPSULE, LIQUID FILLED ORAL at 09:05

## 2024-05-03 RX ADMIN — OXYCODONE AND ACETAMINOPHEN 1 TABLET: 10; 325 TABLET ORAL at 07:05

## 2024-05-03 RX ADMIN — OXYCODONE AND ACETAMINOPHEN 1 TABLET: 10; 325 TABLET ORAL at 09:05

## 2024-05-03 RX ADMIN — HYDROMORPHONE HYDROCHLORIDE 1 MG: 1 INJECTION, SOLUTION INTRAMUSCULAR; INTRAVENOUS; SUBCUTANEOUS at 01:05

## 2024-05-03 RX ADMIN — FERROUS SULFATE TAB 325 MG (65 MG ELEMENTAL FE) 1 EACH: 325 (65 FE) TAB at 09:05

## 2024-05-03 RX ADMIN — OXYCODONE AND ACETAMINOPHEN 1 TABLET: 10; 325 TABLET ORAL at 01:05

## 2024-05-03 RX ADMIN — MAGNESIUM SULFATE HEPTAHYDRATE 2 G/HR: 40 INJECTION, SOLUTION INTRAVENOUS at 11:05

## 2024-05-03 RX ADMIN — IBUPROFEN 600 MG: 600 TABLET, FILM COATED ORAL at 06:05

## 2024-05-03 RX ADMIN — OXYCODONE AND ACETAMINOPHEN 1 TABLET: 10; 325 TABLET ORAL at 04:05

## 2024-05-03 RX ADMIN — OXYCODONE AND ACETAMINOPHEN 1 TABLET: 10; 325 TABLET ORAL at 11:05

## 2024-05-03 NOTE — PLAN OF CARE
Patient transferred to OB room in stable condition and resting comfortably. Dynamap monitors applied, call bell in reach, RN and family at bedside, OB RN performed fundal massage, RN verbalized understanding of report.

## 2024-05-03 NOTE — PROGRESS NOTES
05/02/24 2143   [facility] Note - Strip   Strip Reviewed by [facility] Nurse? Yes   [facility] Note - Communication   [facility] Nurse Communicated with Bedside Nurse Regarding: Fetal Status   [facility] Note - Notification   Nurse Notified? Yes  (at BS per staff nurse)   Name of Nurse Spoke with Anali

## 2024-05-03 NOTE — PROGRESS NOTES
PostPartum Progress Note        Subjective:      Post-Partum Day #1 s/p primary CS  delivery d/t NRFHTs. Antepartum course complicated with severe preeclampsia undergoing postpartum Magnesium Sulfate seizure prophylaxis.    Patient is without complaints.She denies headache, vision disturbance, SOB, chest pain, and RUQ pain. Lochia decreasing. Breast feeding. Pain is well controlled.  Tolerating full regular diet. Pain well controlled. Overall mother and baby are doing well.     Objective:      Vitals:    05/03/24 0600 05/03/24 0800 05/03/24 0925 05/03/24 1000   BP: (!) 106/57 109/61  101/68   BP Location: Right arm Right arm     Patient Position: Sitting Lying     Pulse: 95 90     Resp: 20 20 18 18   Temp: 97.9 °F (36.6 °C) 97.9 °F (36.6 °C)     TempSrc: Oral Oral     SpO2: 99%      Weight:       Height:          Value Min Max   Temp 97.7 °F (36.5 °C) 98.9 °F (37.2 °C)   Pulse 79 101   Resp 18 20   BP: Systolic 90 186 Abnormal    BP: Diastolic 55 Abnormal  115 Abnormal    MAP (mmHg) 66 144   SpO2 89 % Abnormal  100 %           Intake/Output Summary (Last 24 hours) at 5/3/2024 1121  Last data filed at 5/3/2024 0621  Gross per 24 hour   Intake 1526.2 ml   Output 4295 ml   Net -2768.8 ml     Body mass index is 43.37 kg/m².    General: no acute distress. Resting comfortably. Appropriate affect  Abdomen: soft, non-tender, non-distended; Fundus firm and at the umbilicus. Incision: CDI, appropriate TTP.   : no vulvar edema. Lochia is appropriate for POD#1  Extremities: non-tender, symmetric, 1+ edema    Lab Results   Component Value Date/Time    ABORH A NEG 05/02/2024 04:42 PM    ABSCREEN NEG 05/02/2024 04:42 PM     Recent Results (from the past 336 hour(s))   CBC with Differential    Collection Time: 05/03/24  1:44 AM   Result Value Ref Range    WBC 25.39 (H) 4.00 - 11.50 x10(3)/mcL    Hgb 10.0 (L) 11.8 - 16.0 g/dL    Hct 30.2 (L) 36.0 - 48.0 %    Platelet 222 140 - 371 x10(3)/mcL   CBC with Differential    Collection  Time: 24  4:42 PM   Result Value Ref Range    WBC 11.15 4.00 - 11.50 x10(3)/mcL    Hgb 10.7 (L) 11.8 - 16.0 g/dL    Hct 32.7 (L) 36.0 - 48.0 %    Platelet 211 140 - 371 x10(3)/mcL   CBC with Differential    Collection Time: 24  1:26 PM   Result Value Ref Range    WBC 10.13 4.00 - 11.50 x10(3)/mcL    Hgb 10.3 (L) 11.8 - 16.0 g/dL    Hct 30.7 (L) 36.0 - 48.0 %    Platelet 202 140 - 371 x10(3)/mcL          Assessment:     38 y.o.  S/P CS Post-Partum Day #1s /p primary CS  delivery d/t NRFHTs. Antepartum course complicated with severe preeclampsia undergoing postpartum Magnesium Sulfate seizure prophylaxis. Postpartum leukocytosis without evidence of acute infection at this time. Will continue to closely monitor for s/sx of sepsis.     Plan:     1. Continue routine postpartum care ambulation encouraged   2. Labs: continue to monitor Magnesium Sulfate levels and repeat CBC now and in AM  3. Breast Feeding encouraged, lactation consult  4. Anticipate Magnesium Sulfate d/c at 2300 today    Rebecca Keller MD  Today  11:31 AM

## 2024-05-03 NOTE — ANESTHESIA POSTPROCEDURE EVALUATION
Anesthesia Post Evaluation    Patient: Rachael Corrales    Procedure(s) Performed: * No procedures listed *    Final Anesthesia Type: spinal      Patient location during evaluation: PACU  Patient participation: Yes- Able to Participate  Level of consciousness: awake and alert, awake and oriented  Post-procedure vital signs: reviewed and stable  Pain management: adequate  Airway patency: patent  EDUARDO mitigation strategies: Preoperative initiation of continuous positive airway pressure (CPAP) or non-invasive positive pressure ventilation (NIPPV), Multimodal analgesia and Use of major conduction anesthesia (spinal/epidural) or peripheral nerve block  PONV status at discharge: No PONV  Anesthetic complications: no      Cardiovascular status: blood pressure returned to baseline  Respiratory status: unassisted, room air and spontaneous ventilation  Hydration status: euvolemic  Follow-up not needed.              Vitals Value Taken Time   /62 05/02/24 2208   Temp 36.5 °C (97.7 °F) 05/02/24 1854   Pulse 94 05/03/24 0002   Resp 20 05/02/24 1854   SpO2 100 % 05/03/24 0002   Vitals shown include unfiled device data.      No case tracking events are documented in the log.      Pain/Gulshan Score: No data recorded

## 2024-05-03 NOTE — PROGRESS NOTES
05/02/24 2234   [facility] Note - Strip   Strip Reviewed by Olegario Nurse? Yes   [facility] Note - Communication   [place] Nurse Communicated with Bedside Nurse Regarding: Fetal Status;Other (See Note)   [facility] Note - Notification   Nurse Notified? Yes  (Anali yeager MD is present)

## 2024-05-03 NOTE — PROGRESS NOTES
05/02/24 2142   [facility] Note - Strip   Strip Reviewed by Olegario Nurse? Yes   [facility] Note - Communication   [place] Nurse Communicated with Bedside Nurse Regarding: Fetal Status   [facility] Note - Notification   Nurse Notified? Yes  (at BS per staff Nurse)   Name of Nurse Spoke with Anali

## 2024-05-03 NOTE — PLAN OF CARE
Viable female born @1051, cord blood given to OB nurse, cord given to respiratory for cord gases per Dr. Oshea verbal order.

## 2024-05-04 LAB
ERYTHROCYTE [DISTWIDTH] IN BLOOD BY AUTOMATED COUNT: 13.6 % (ref 11–14.5)
HCT VFR BLD AUTO: 27.2 % (ref 36–48)
HGB BLD-MCNC: 9 G/DL (ref 11.8–16)
MCH RBC QN AUTO: 27.2 PG (ref 27–34)
MCHC RBC AUTO-ENTMCNC: 33.1 G/DL (ref 31–37)
MCV RBC AUTO: 82.2 FL (ref 79–99)
NRBC BLD AUTO-RTO: 0 %
PLATELET # BLD AUTO: 213 X10(3)/MCL (ref 140–371)
PMV BLD AUTO: 11 FL (ref 9.4–12.4)
RBC # BLD AUTO: 3.31 X10(6)/MCL (ref 4–5.1)
WBC # SPEC AUTO: 13.51 X10(3)/MCL (ref 4–11.5)

## 2024-05-04 PROCEDURE — 25000003 PHARM REV CODE 250: Performed by: OBSTETRICS & GYNECOLOGY

## 2024-05-04 PROCEDURE — 36415 COLL VENOUS BLD VENIPUNCTURE: CPT | Performed by: OBSTETRICS & GYNECOLOGY

## 2024-05-04 PROCEDURE — 85027 COMPLETE CBC AUTOMATED: CPT | Performed by: OBSTETRICS & GYNECOLOGY

## 2024-05-04 PROCEDURE — 11000001 HC ACUTE MED/SURG PRIVATE ROOM

## 2024-05-04 RX ADMIN — DOCUSATE SODIUM 200 MG: 100 CAPSULE, LIQUID FILLED ORAL at 10:05

## 2024-05-04 RX ADMIN — FERROUS SULFATE TAB 325 MG (65 MG ELEMENTAL FE) 1 EACH: 325 (65 FE) TAB at 10:05

## 2024-05-04 RX ADMIN — IBUPROFEN 600 MG: 600 TABLET, FILM COATED ORAL at 05:05

## 2024-05-04 RX ADMIN — PRENATAL VITAMINS-IRON FUMARATE 27 MG IRON-FOLIC ACID 0.8 MG TABLET 1 TABLET: at 10:05

## 2024-05-04 RX ADMIN — IBUPROFEN 600 MG: 600 TABLET, FILM COATED ORAL at 11:05

## 2024-05-04 RX ADMIN — SIMETHICONE 80 MG: 80 TABLET, CHEWABLE ORAL at 06:05

## 2024-05-04 RX ADMIN — SIMETHICONE 80 MG: 80 TABLET, CHEWABLE ORAL at 12:05

## 2024-05-04 RX ADMIN — IBUPROFEN 600 MG: 600 TABLET, FILM COATED ORAL at 01:05

## 2024-05-04 RX ADMIN — DOCUSATE SODIUM 200 MG: 100 CAPSULE, LIQUID FILLED ORAL at 09:05

## 2024-05-05 VITALS
WEIGHT: 252.63 LBS | OXYGEN SATURATION: 99 % | HEIGHT: 64 IN | RESPIRATION RATE: 20 BRPM | HEART RATE: 88 BPM | TEMPERATURE: 98 F | DIASTOLIC BLOOD PRESSURE: 68 MMHG | SYSTOLIC BLOOD PRESSURE: 124 MMHG | BODY MASS INDEX: 43.13 KG/M2

## 2024-05-05 PROCEDURE — 25000003 PHARM REV CODE 250: Performed by: OBSTETRICS & GYNECOLOGY

## 2024-05-05 RX ORDER — LABETALOL 200 MG/1
200 TABLET, FILM COATED ORAL EVERY 12 HOURS
Status: DISCONTINUED | OUTPATIENT
Start: 2024-05-05 | End: 2024-05-05 | Stop reason: HOSPADM

## 2024-05-05 RX ORDER — HYDROCODONE BITARTRATE AND ACETAMINOPHEN 7.5; 325 MG/1; MG/1
1 TABLET ORAL EVERY 4 HOURS PRN
Qty: 15 TABLET | Refills: 0 | Status: SHIPPED | OUTPATIENT
Start: 2024-05-05 | End: 2024-05-22

## 2024-05-05 RX ORDER — FERROUS SULFATE 325(65) MG
325 TABLET, DELAYED RELEASE (ENTERIC COATED) ORAL DAILY
Qty: 90 TABLET | Refills: 0 | Status: SHIPPED | OUTPATIENT
Start: 2024-05-05 | End: 2024-05-22

## 2024-05-05 RX ORDER — DOCUSATE SODIUM 100 MG/1
200 CAPSULE, LIQUID FILLED ORAL 2 TIMES DAILY
Qty: 60 CAPSULE | Refills: 2 | Status: SHIPPED | OUTPATIENT
Start: 2024-05-05 | End: 2024-05-22

## 2024-05-05 RX ORDER — LABETALOL 200 MG/1
200 TABLET, FILM COATED ORAL EVERY 12 HOURS
Qty: 180 TABLET | Refills: 0 | Status: SHIPPED | OUTPATIENT
Start: 2024-05-05 | End: 2024-08-03

## 2024-05-05 RX ORDER — IBUPROFEN 600 MG/1
600 TABLET ORAL EVERY 6 HOURS PRN
Qty: 30 TABLET | Refills: 0 | Status: SHIPPED | OUTPATIENT
Start: 2024-05-05 | End: 2024-05-22

## 2024-05-05 RX ADMIN — LABETALOL HYDROCHLORIDE 200 MG: 200 TABLET, FILM COATED ORAL at 08:05

## 2024-05-05 RX ADMIN — IBUPROFEN 600 MG: 600 TABLET, FILM COATED ORAL at 11:05

## 2024-05-05 RX ADMIN — DOCUSATE SODIUM 200 MG: 100 CAPSULE, LIQUID FILLED ORAL at 08:05

## 2024-05-05 RX ADMIN — IBUPROFEN 600 MG: 600 TABLET, FILM COATED ORAL at 05:05

## 2024-05-05 RX ADMIN — PRENATAL VITAMINS-IRON FUMARATE 27 MG IRON-FOLIC ACID 0.8 MG TABLET 1 TABLET: at 08:05

## 2024-05-05 RX ADMIN — FERROUS SULFATE TAB 325 MG (65 MG ELEMENTAL FE) 1 EACH: 325 (65 FE) TAB at 08:05

## 2024-05-05 NOTE — DISCHARGE SUMMARY
[facility]  [facility]      Discharge Provider: Rebecca Keller MD    Admission date: 2024  Discharge date: 2024    Admit Dx:   Patient Active Problem List   Diagnosis    Rh negative status during pregnancy    AMA (advanced maternal age) multigravida 35+    h/o SAb with confirmed T21    At high risk for complications of intrauterine pregnancy (IUP)    Increased BMI affecting pregnancy in third trimester    BMI at 38.7 at initial MFM consult visit    Palpitations    Screening and evaluation for female sterilization    Uterine contractions/SROM    37 weeks gestation of pregnancy    Severe pre-eclampsia, with delivery     Discharge Dx:    Primary c-sections d/t non-reassuring fetal heart tones  Severe preeclampsia    Hospital Course:  Rachael Corrales is a 38 y.o. now  who was admitted on 2024 for delivery after SROM at 37.4 weeks gestation complicated with intrapartum severe preeclampsia. Patient delivered a viable  via primary  d/t NRFHTs. Please see delivery note for further details. Pt was in stable condition post delivery.  Her postpartum course was uncomplicated. On the date of discharge, patient's pain is controlled with oral pain medications. She is tolerating ambulation without SOB or CP, and PO diet without N/V. Reported lochia is within the normal range.Patient note to have liable blood pressures in the mild range. Treatment with Labetalol was initiated. Patient denies headaches, visual disturbance and RUQ pain. She reports minimal lower extremity edema. Pt in stable condition and ready for discharge.       DISCHARGE PHYSICAL EXAM  Vitals:    24 1930 24 2329 24 0330 24 0857   BP: 135/82 121/68 121/69 (!) 147/87   BP Location: Right arm Right arm Right arm    Patient Position: Lying Lying Lying    Pulse: 98 93 85 87   Resp: 18 18 18    Temp: 98.1 °F (36.7 °C) 98.6 °F (37 °C) 97.8 °F (36.6 °C)    TempSrc:  Oral Oral Oral    SpO2: 99% 100% 99%    Weight:       Height:               General: no acute distress  Abdomen: soft, non-tender, non-distended; Fundus firm periumbilical.   Incision: C/D/I without evidence of infection  Extremities: non-tender, symmetric, trace BLE edema, neg Melody's Bilateral     Pertinent studies:  Lab Results   Component Value Date/Time    ABORH A NEG 05/02/2024 04:42 PM    ABSCREEN NEG 05/02/2024 04:42 PM     Recent Results (from the past 336 hour(s))   CBC with Differential    Collection Time: 05/03/24 11:50 AM   Result Value Ref Range    WBC 18.10 (H) 4.00 - 11.50 x10(3)/mcL    Hgb 9.2 (L) 11.8 - 16.0 g/dL    Hct 28.0 (L) 36.0 - 48.0 %    Platelet 215 140 - 371 x10(3)/mcL   CBC with Differential    Collection Time: 05/03/24  1:44 AM   Result Value Ref Range    WBC 25.39 (H) 4.00 - 11.50 x10(3)/mcL    Hgb 10.0 (L) 11.8 - 16.0 g/dL    Hct 30.2 (L) 36.0 - 48.0 %    Platelet 222 140 - 371 x10(3)/mcL   CBC with Differential    Collection Time: 05/02/24  4:42 PM   Result Value Ref Range    WBC 11.15 4.00 - 11.50 x10(3)/mcL    Hgb 10.7 (L) 11.8 - 16.0 g/dL    Hct 32.7 (L) 36.0 - 48.0 %    Platelet 211 140 - 371 x10(3)/mcL         Disposition: To home, self care    Follow Up:  3 days     Patient Instructions:   1. Call the office for any bleeding >2 pads/hour for >2 hours, temperature >100.4, pain that is uncontrolled with medications, or for any other concerns. Keep a twice daily blood pressure log and notify her provider if severe range blood pressures and or symptoms.   2. Pre Eclampsia precautions  3. Pelvic rest x 6 weeks  4. No driving while on narcotics.      Rebecca Keller MD   05/05/2024  10:21 AM

## 2024-05-05 NOTE — DISCHARGE SUMMARY
[facility]  Discharge Note  Short Stay    Procedure(s) (LRB):   SECTION (N/A)      OUTCOME: Patient tolerated treatment/procedure well without complication and is now ready for discharge.    DISPOSITION: Home or Self Care    FINAL DIAGNOSIS:  Uterine contractions    FOLLOWUP: In clinic in 3 days for blood pressure check.     DISCHARGE INSTRUCTIONS:   Patient Instructions:   1. Call the office for any bleeding >2 pads/hour for >2 hours, temperature >100.4, pain that is uncontrolled with medications, or for any other concerns. Keep a twice daily blood pressure log and notify her provider if severe range blood pressures and or symptoms.   2. Pre Eclampsia precautions  3. Pelvic rest x 6 weeks  4. No driving while on narcotics.        Clinical Reference Documents Added to Patient Instructions         Document     ( DELIVERY) DISCHARGE INSTRUCTIONS (ENGLISH)    LABETALOL, ADULT (ENGLISH)    POSTPARTUM DEPRESSION DISCHARGE INSTRUCTIONS (ENGLISH)    PREECLAMPSIA DISCHARGE INSTRUCTIONS (ENGLISH)            TIME SPENT ON DISCHARGE: 30 minutes

## 2024-05-07 NOTE — L&D DELIVERY NOTE
[facility]   Section   Operative Note    SUMMARY     Date of Procedure: 2024     Procedure: Procedure(s) (LRB):   SECTION (N/A)    Surgeons and Role:     * Yoanna Oshea MD - Primary    Assisting Surgeon: None    Pre-Operative Diagnosis: Fetal distress affecting labor [O77.9]    Post-Operative Diagnosis: Post-Op Diagnosis Codes:     * Fetal distress affecting labor [O77.9]    Patient Active Problem List   Diagnosis    Rh negative status during pregnancy    AMA (advanced maternal age) multigravida 35+    h/o SAb with confirmed T21    At high risk for complications of intrauterine pregnancy (IUP)    Increased BMI affecting pregnancy in third trimester    BMI at 38.7 at initial [facility] consult visit    Palpitations    Severe pre-eclampsia, with delivery      Anesthesia:  Epidural     Technical Procedures Used: Lower Transverse hysterotomy via Pfannenstiel incision           Description of the Findings of the Procedure: cord with true knot. normal appearing uterus, bilateral ovaries and fallopian tubes. See infant information bellow.     Complications: No     Blood Loss: 800mL     Condition: Good     Disposition: PACU - hemodynamically stable.     Attestation: Good     Indications: recurrent lates despite resuscitative measures. Declines tubal at time of CS.     PROCEDURE IN DETAIL:   After verbal consent was obtained the patient was brought to the operating room she was then prepped and draped in normal sterile fashion in the dorsal supine position. An appropriate time out was taken with OR team members.  Azithromycin 500mg /2g Ancef given OCTOR.  After confirming adequate anesthesia, a pfannenstiel incision was made 2-3 cm above the pubic symphysis through the skin, subcutaneous tissue down to the fascia. The fascia was then incised with the scalpel and was then extended bilaterally using the curved whatley scissors.  The rectus muscles were then dissected from the attachment to  the fascia superiorly and inferiorly.  The peritoneum was then grasped with 2 hemostats and entered sharply with the Metzenbaum scissors. No adhesions were noted, the incision was then extended.  The bladder blade was then inserted.  The vesicouterine peritoneum was then incised with the Metzenbaum scissors and the bladder flap was then created. The bladder blade was then reinserted.   The lower uterine segment and position of the fetus was identified.  Low Transverse incision was made through  the lower uterine segment and extended laterally with blunt dissection. Clear fluid noted.  Infant delivered from vertex presentation.  Cord clamped and cut the  was handed to nursery nurse. Cord blood taken, placenta delivered. The uterus was exteriorized and cleared of all clot and debris.  The hysterotomy was repaired with a 1-0 Monocryl in a running locked fashion.  A 2nd layer of the same suture was used to obtain excellent hemostasis via imbrication.  Irrigation was performed. The uterus was returned to the abdomen.  The hysterotomy site was noted to be hemostatic.  Rectus muscles and peritoneum were reapproximated with a 1-0 chromic in interrupted fashion.  Fascia was reapproximated with a 1-0 looped PDS in a running fashion.  Dennis's was reapproximated with a 3-0 plain gut in a running fashion.  Skin was closed with  4-0 Vicryl on a Jewel needle.    Dermabond was applied.  Patient tolerated the procedure well sponge lap needle counts were correct x3.             Specimens:   Cord gases       Condition: Good    VTE Risk Mitigation (From admission, onward)           Ordered     IP VTE LOW RISK PATIENT  Once         24 0043                    Disposition: PACU - hemodynamically stable.    Attestation: Good         Delivery Information for Girl Rachael Corrales    Birth information:  YOB: 2024   Time of birth: 10:51 PM   Sex: female   Head Delivery Date/Time: 2024 10:51 PM   Delivery type:  ", Low Transverse   Gestational Age: 37w4d        Delivery Providers    Delivering clinician: Yoanna Oshea MD   Provider Role    Irma Merlos RN Registered Nurse    Anali Major RN Registered Nurse     Respiratory Therapist    Vaughn Deshpande MD Pediatric Nursery              Measurements    Weight: 3557 g  Weight (lbs): 7 lb 13.5 oz  Length: 52.1 cm  Length (in): 20.5"  Head circumference: 33.7 cm         Apgars    Living status: Living  Apgar Component Scores:  1 min.:  5 min.:  10 min.:  15 min.:  20 min.:    Skin color:  0  1       Heart rate:  2  2       Reflex irritability:  2  2       Muscle tone:  2  2       Respiratory effort:  2  2       Total:  8  9       Apgars assigned by: XUAN MCNEIL         Operative Delivery    Forceps attempted?: No  Vacuum extractor attempted?: No         Shoulder Dystocia    Shoulder dystocia present?: No           Presentation    Presentation: Vertex  Position: Middle Occiput Anterior           Interventions/Resuscitation    Method: Bulb Suctioning, Tactile Stimulation, Deep Suctioning       Cord    Vessels: 3 vessels  Complications: Knot  Delayed Cord Clamping?: No  Cord Blood Disposition: Lab  Gases Sent?: Yes  Stem Cell Collection (by MD): No       Placenta    Placenta delivery date/time: 2024 2252  Placenta removal: Manual removal  Placenta appearance: Intact  Placenta disposition: Discarded           Labor Events:       labor: No     Labor Onset Date/Time: 2024 15:30     Dilation Complete Date/Time: 2024 22:00     Start Pushing Date/Time: 2024 22:05     Rupture Date/Time: 24  1530         Rupture type: SRM (Spontaneous Rupture)         Fluid Amount:       Fluid Color: Clear       steroids:       Antibiotics given for GBS: No     Induction: none     Indications for induction:        Augmentation: oxytocin     Indications for augmentation: Ineffective Contraction Pattern     Labor complications: Fetal " Intolerance     Additional complications:          Cervical ripening:                     Delivery:      Episiotomy:       Indication for Episiotomy:       Perineal Lacerations:   Repaired:      Periurethral Laceration:   Repaired:     Labial Laceration:   Repaired:     Sulcus Laceration:   Repaired:     Vaginal Laceration:   Repaired:     Cervical Laceration:   Repaired:     Repair suture:       Repair # of packets:       Last Value - EBL - Nursing (mL):       Sum - EBL - Nursing (mL): 0     Last Value - EBL - Anesthesia (mL): 800      Calculated QBL (mL): 1045      Running total QBL (mL): 1045      Vaginal Sweep Performed:       Surgicount Correct:         Other providers:       Anesthesia    Method: Epidural          Details (if applicable):  Trial of Labor Yes    Categorization: Primary    Priority: Emergency   Indications for : Fetal heart rate abnormalities   Incision Type: low transverse     Additional  information:  Forceps:    Vacuum:    Breech:    Observed anomalies    Other (Comments):

## 2024-05-08 ENCOUNTER — CLINICAL SUPPORT (OUTPATIENT)
Dept: OBSTETRICS AND GYNECOLOGY | Facility: CLINIC | Age: 39
End: 2024-05-08
Payer: COMMERCIAL

## 2024-05-08 VITALS
BODY MASS INDEX: 42.02 KG/M2 | DIASTOLIC BLOOD PRESSURE: 78 MMHG | SYSTOLIC BLOOD PRESSURE: 124 MMHG | WEIGHT: 244.81 LBS | TEMPERATURE: 98 F

## 2024-05-08 DIAGNOSIS — F41.8 POSTPARTUM ANXIETY: Primary | ICD-10-CM

## 2024-05-08 PROCEDURE — 99213 OFFICE O/P EST LOW 20 MIN: CPT | Mod: 24,,, | Performed by: OBSTETRICS & GYNECOLOGY

## 2024-05-08 RX ORDER — SERTRALINE HYDROCHLORIDE 50 MG/1
50 TABLET, FILM COATED ORAL DAILY
Qty: 30 TABLET | Refills: 1 | Status: SHIPPED | OUTPATIENT
Start: 2024-05-08 | End: 2024-05-22 | Stop reason: SDUPTHER

## 2024-05-08 RX ORDER — HYDROXYZINE PAMOATE 25 MG/1
25 CAPSULE ORAL EVERY 6 HOURS PRN
Qty: 30 CAPSULE | Refills: 1 | Status: SHIPPED | OUTPATIENT
Start: 2024-05-08

## 2024-05-21 NOTE — PROGRESS NOTES
Chief Complaint:  F/U Zoloft    History of Present Illness:  Patient is a 38 y.o.  presents to follow Zoloft 50 mg secondary to postpartum anxiety. Mood is good. No complaints. Denies HI/SI. Able to care for self and infant. Content with Zoloft.       Gyn History:  Menstrual History   Cycle: No  Menarche Age: 12 years  No Cycle Reason: Other  Other Reason: Recent Pregnancy  [place]  Sexually Active: No (Simultaneous filing. User may not have seen previous data.)  Sexual Orientation: Test  STI History: No (Simultaneous filing. User may not have seen previous data.)  STI Type:  (HPV)  Contraception: No (Simultaneous filing. User may not have seen previous data.)  Menopause  Menopause Age: 1 years  Pap History   Last pap date: 24  Result: Normal  History of Abnormal Pap: (!) Yes (Simultaneous filing. User may not have seen previous data.)  HPV Vaccine Completed: No (Simultaneous filing. User may not have seen previous data.)      Review of systems:  General/Constitutional: Chills denies. Fatigue/weakness denies. Fever denies. Night sweats denies. Hot flashes denies  Breast: Dimpling denies. Breast mass denies. Breast pain/tenderness denies. Nipple discharge denies. Puckering denies.  Gastrointestinal: Abdominal pain denies. Blood in stool denies. Constipation denies. Diarrhea denies. Heartburn denies. Nausea denies. Vomiting denies   Genitourinary: Incontinence denies. Blood in urine denies. Frequent urination denies. Urgency denies. Painful urination denies. Nocturia denies   Gynecologic: Irregular menses denies. Heavy bleeding denies. Painful menses denies. Vaginal discharge denies. Vaginal odor denies. Vaginal itching/Irritation denies. Vaginal lesion denies.  Pelvic pain denies. Decreased libido denies. Vulvar lesion denies. Prolapse of genital organs denies. Painful intercourse denies. Postcoital bleeding denies   Psychiatric: Mood lability denies. Depressed mood denies. Suicidal thoughts denies.  "Anxiety denies. Overwhelmed denies. Appetite normal. Energy level normal.     OB History    Para Term  AB Living   5 4 4 0 1 4   SAB IAB Ectopic Multiple Live Births   1 0 0 0 4      # 1 - Date: 06, Sex: Female, Weight: 3.6 kg (7 lb 15 oz), GA: 40w0d, Type: Vaginal, Spontaneous, Apgar1: None, Apgar5: None, Living: Living, Birth Comments: None    # 2 - Date: 09, Sex: Female, Weight: 2.608 kg (5 lb 12 oz), GA: 37w0d, Type: Vaginal, Spontaneous, Apgar1: None, Apgar5: None, Living: Living, Birth Comments: None    # 3 - Date: 19, Sex: Male, Weight: 3.345 kg (7 lb 6 oz), GA: 40w0d, Type: Vaginal, Spontaneous, Apgar1: None, Apgar5: None, Living: Living, Birth Comments: None    # 4 - Date: 23, Sex: Unknown, Weight: None, GA: None, Type: Spontaneous , Apgar1: None, Apgar5: None, Living: Fetal Demise, Birth Comments: Suction D&C... BABY HAD TRISOMY 21.    # 5 - Date: 24, Sex: Female, Weight: 3.557 kg (7 lb 13.5 oz), GA: 37w4d, Type: , Low Transverse, Apgar1: 8, Apgar5: 9, Living: Living, Birth Comments: None      Past Medical History:   Diagnosis Date    Abnormal Pap smear of cervix 2017    HPV, NORMAL NOW    Mental disorder 2017    ADHD AND ANXIETY, NOT CURRENTLY    Missed  2023    Rh incompatibility     A NEG       Current Outpatient Medications:     hydrOXYzine pamoate (VISTARIL) 25 MG Cap, Take 1 capsule (25 mg total) by mouth every 6 (six) hours as needed (anxiety)., Disp: 30 capsule, Rfl: 1    labetaloL (NORMODYNE) 200 MG tablet, Take 1 tablet (200 mg total) by mouth every 12 (twelve) hours., Disp: 180 tablet, Rfl: 0    prenatal vit no.124/iron/folic (PRENATAL VITAMIN ORAL), Take 1 tablet by mouth once daily., Disp: , Rfl:     sertraline (ZOLOFT) 50 MG tablet, Take 1 tablet (50 mg total) by mouth once daily., Disp: 30 tablet, Rfl: 11      Physical Exam:  /74   Temp 97.5 °F (36.4 °C)   Ht 5' 4" (1.626 m)   Wt 100.9 kg (222 lb 6.4 oz)   " Breastfeeding No   BMI 38.17 kg/m²     Constitutional: General appearance: healthy, well-nourished and well-developed   Psychiatric:  Orientation to time, place and person. Normal mood and affect and active, alert       Assessment/Plan:  1. Postpartum anxiety  Educated   HI & SI precautions  Mood is good  Content with Zoloft 50 mg  RTC for annual or PRN         This note was transcribed by Alondra Dent MA. There may be transcription errors as a result, however minimal. Effort has been made to ensure accuracy of transcription, but any obvious errors or omissions should be clarified with the author of the document.

## 2024-05-22 ENCOUNTER — OFFICE VISIT (OUTPATIENT)
Dept: OBSTETRICS AND GYNECOLOGY | Facility: CLINIC | Age: 39
End: 2024-05-22
Payer: COMMERCIAL

## 2024-05-22 VITALS
DIASTOLIC BLOOD PRESSURE: 74 MMHG | TEMPERATURE: 98 F | BODY MASS INDEX: 37.97 KG/M2 | SYSTOLIC BLOOD PRESSURE: 122 MMHG | WEIGHT: 222.38 LBS | HEIGHT: 64 IN

## 2024-05-22 DIAGNOSIS — F41.8 POSTPARTUM ANXIETY: Primary | ICD-10-CM

## 2024-05-22 PROCEDURE — 3078F DIAST BP <80 MM HG: CPT | Mod: CPTII,,, | Performed by: OBSTETRICS & GYNECOLOGY

## 2024-05-22 PROCEDURE — 3074F SYST BP LT 130 MM HG: CPT | Mod: CPTII,,, | Performed by: OBSTETRICS & GYNECOLOGY

## 2024-05-22 PROCEDURE — 0503F POSTPARTUM CARE VISIT: CPT | Mod: CPTII,,, | Performed by: OBSTETRICS & GYNECOLOGY

## 2024-05-22 PROCEDURE — 1159F MED LIST DOCD IN RCRD: CPT | Mod: CPTII,,, | Performed by: OBSTETRICS & GYNECOLOGY

## 2024-05-22 RX ORDER — SERTRALINE HYDROCHLORIDE 50 MG/1
50 TABLET, FILM COATED ORAL DAILY
Qty: 30 TABLET | Refills: 11 | Status: SHIPPED | OUTPATIENT
Start: 2024-05-22 | End: 2025-05-22

## 2024-06-19 NOTE — PROGRESS NOTES
Chief Complaint:  Postpartum Care    History of Present Illness:   Rachael Corrales is a 38 y.o. female  status post CS (24) presents for her 6 week postpartum visit. Denies any complaints. Infant doing well, bottle feeding. Lochia resolved. Partner is getting a vasectomy.    Mood is good. Pt is happy with Zoloft 50 mg. Desires to continue.      Gyn History:  Menstrual History   Cycle: No  Menarche Age: 12 years  Other Reason: Recent Pregnancy  [place]  Sexually Active: Yes  Sexual Orientation: heterosexual  Postcoital Bleeding: No  Dyspareunia: No  STI History: Yes (HPV)  Contraception: No  Menopause  Menopause Age: 1 years  Breast History  Last Breast Imaging Date: No  History of Breast Biopsy: No  Pap History   Last pap date: 24  Result: Normal  History of Abnormal Pap: (!) Yes (+HPV)  HPV Vaccine Completed: No (0/3)      Review of Systems:  General/Constitutional: Chills denies. Fatigue/weakness denies. Fever denies. Night sweats denies. Hot flashes denies  Gastrointestinal: Abdominal pain denies. Blood in stool denies. Constipation denies. Diarrhea denies. Heartburn denies. Nausea denies. Vomiting denies   Genitourinary: Incontinence denies. Blood in urine denies. Frequent urination denies. Urgency denies. Painful urination denies. Nocturia denies   Gynecologic: Irregular menses denies. Heavy bleeding  denies. Painful menses denies. Vaginal discharge denies.Vaginal odor denies. Vaginal itching/Irritation denies. Vaginal lesion denies.  Pelvic pain denies. Decreased libido denies. Vulvar lesion denies. Prolapse of genital organs denies. Painful intercourse denies. Postcoital bleeding denies   Psychiatric: Mood lability denies. Depressed mood denies. Suicidal thoughts denies. Anxiety denies. Overwhelmed denies. Appetite normal. Energy level normal     OB History    Para Term  AB Living   5 4 4 0 1 4   SAB IAB Ectopic Multiple Live Births   1 0 0 0 4      # 1 - Date: 06,  "Sex: Female, Weight: 3.6 kg (7 lb 15 oz), GA: 40w0d, Type: Vaginal, Spontaneous, Apgar1: None, Apgar5: None, Living: Living, Birth Comments: None    # 2 - Date: 09, Sex: Female, Weight: 2.608 kg (5 lb 12 oz), GA: 37w0d, Type: Vaginal, Spontaneous, Apgar1: None, Apgar5: None, Living: Living, Birth Comments: None    # 3 - Date: 19, Sex: Male, Weight: 3.345 kg (7 lb 6 oz), GA: 40w0d, Type: Vaginal, Spontaneous, Apgar1: None, Apgar5: None, Living: Living, Birth Comments: None    # 4 - Date: 23, Sex: Unknown, Weight: None, GA: None, Type: Spontaneous , Apgar1: None, Apgar5: None, Living: Fetal Demise, Birth Comments: Suction D&C... BABY HAD TRISOMY 21.    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 (Patient not taking: Reported on 2024), Disp: 30 capsule, Rfl: 1    norgestimate-ethinyl estradioL (ORTHO-CYCLEN) 0.25-35 mg-mcg per tablet, Take 1 tablet by mouth once daily., Disp: 84 tablet, Rfl: 0    sertraline (ZOLOFT) 50 MG tablet, Take 1 tablet (50 mg total) by mouth once daily., Disp: 90 tablet, Rfl: 3      Physical Exam:  /72   Temp 97.5 °F (36.4 °C)   Ht 5' 4" (1.626 m)   Wt 101.2 kg (223 lb)   LMP  (LMP Unknown)   Breastfeeding No   BMI " 38.28 kg/m²      Chaperone present.      Constitutional: General appearance: healthy, well-nourished and well-developed  Psychiatric: Orientation to time, place and person. Normal mood and affect and active, alert   Breast: Inspection/palpation: no tenderness, masses, skin changes or abnormal secretions   Abdomen:   Auscultation/Inspection/Palpation: No tenderness or masses. Soft, nondistended   Inspection of incision site: well healed, clean, dry, intact and non-tender, no SOI  Hernia: no palpable hernia         Assessment/Plan:  1. Postpartum exam  Patient doing well  Diet exercise encouraged  Multivitamin    2. Postpartum anxiety  Educated  Counseling information given for [facility] [phone]  HI & SI precautions  Happy with Zoloft  Desires to continue Zoloft 50 mg    -     sertraline (ZOLOFT) 50 MG tablet; Take 1 tablet (50 mg total) by mouth once daily.  Dispense: 90 tablet; Refill: 3    3. Encounter for initial prescription of contraceptive pills  UPT negative  Partner getting a vasectomy,   Desires back up, partner needs clear SA    Explained common options for contraception including natural family planning, barrier methods, depo-provera, ocps, patch, nuvaring, IUD, Nexplanon, and sterilization.        Discussed that pills should be taken at the same time every day to minimize breakthrough bleeding and to expect breakthrough bleeding for the first 3 months and then it should resolve. If BTB continues after 3 months, a change may be necessary.        Advised patient that smoking is harmful due to increased risks of stroke, heart attack and blood clots when taking pills. Patient to contact us immediately if she experiences severe abdominal pain, severe chest pain, severe headaches, eye-visual changes, severe leg pain or SOB.       Discussed that birth control do not protect against STDs.  Rx: Sprintec  RTC 3 mos    -     POCT urine pregnancy  -     norgestimate-ethinyl estradioL (ORTHO-CYCLEN)  0.25-35 mg-mcg per tablet; Take 1 tablet by mouth once daily.  Dispense: 84 tablet; Refill: 0

## 2024-06-20 ENCOUNTER — POSTPARTUM VISIT (OUTPATIENT)
Dept: OBSTETRICS AND GYNECOLOGY | Facility: CLINIC | Age: 39
End: 2024-06-20
Payer: COMMERCIAL

## 2024-06-20 VITALS
DIASTOLIC BLOOD PRESSURE: 72 MMHG | BODY MASS INDEX: 38.07 KG/M2 | WEIGHT: 223 LBS | HEIGHT: 64 IN | SYSTOLIC BLOOD PRESSURE: 122 MMHG | TEMPERATURE: 98 F

## 2024-06-20 DIAGNOSIS — Z30.011 ENCOUNTER FOR INITIAL PRESCRIPTION OF CONTRACEPTIVE PILLS: ICD-10-CM

## 2024-06-20 DIAGNOSIS — F41.8 POSTPARTUM ANXIETY: ICD-10-CM

## 2024-06-20 LAB
B-HCG UR QL: NEGATIVE
CTP QC/QA: YES

## 2024-06-20 RX ORDER — SERTRALINE HYDROCHLORIDE 50 MG/1
50 TABLET, FILM COATED ORAL DAILY
Qty: 90 TABLET | Refills: 3 | Status: SHIPPED | OUTPATIENT
Start: 2024-06-20 | End: 2025-06-20

## 2024-06-20 RX ORDER — NORGESTIMATE AND ETHINYL ESTRADIOL 0.25-0.035
1 KIT ORAL DAILY
Qty: 84 TABLET | Refills: 0 | Status: SHIPPED | OUTPATIENT
Start: 2024-06-20 | End: 2025-06-20

## 2024-10-04 DIAGNOSIS — Z30.011 ENCOUNTER FOR INITIAL PRESCRIPTION OF CONTRACEPTIVE PILLS: ICD-10-CM

## 2024-10-07 RX ORDER — NORGESTIMATE AND ETHINYL ESTRADIOL 0.25-0.035
1 KIT ORAL
Qty: 28 TABLET | Refills: 0 | Status: SHIPPED | OUTPATIENT
Start: 2024-10-07

## (undated) DEVICE — PACK BASIC

## (undated) DEVICE — DRAPE UNDER BUTTOCKS SUC PORT

## (undated) DEVICE — PACK C-SECTION CUSTOM

## (undated) DEVICE — TRAY CATH URETHRAL FOLEY 16FR

## (undated) DEVICE — SYR 3CC LUER LOC

## (undated) DEVICE — CHLORAPREP W TINT 26ML APPL

## (undated) DEVICE — PAD SURFACE PREP 17

## (undated) DEVICE — UNDERGLOVES BIOGEL PI SZ 7 LF

## (undated) DEVICE — SET DISPOSABLE COLLECTION

## (undated) DEVICE — DRESSING TRANS 4X10 TEGADERM

## (undated) DEVICE — SYR 10CC LUER LOCK

## (undated) DEVICE — SUT 0 27IN CHROMIC GUT CT-1

## (undated) DEVICE — TOWEL OR NONABSORB ADH 17X26

## (undated) DEVICE — DRAPE HALF SURGICAL 40X58IN

## (undated) DEVICE — SUT 3/0 27IN COATED VICRYL

## (undated) DEVICE — VACURETTE 10MM CURVED

## (undated) DEVICE — DRESSING TELFA N ADH 3X8

## (undated) DEVICE — ADHESIVE DERMABOND ADVANCED

## (undated) DEVICE — DRAPE LEGGINGS CUFF 31X48IN

## (undated) DEVICE — UNDERPAD ULTRASORB 300LB 30X36

## (undated) DEVICE — TOWEL OR DISP STRL BLUE 4/PK

## (undated) DEVICE — CURRETTE VACCUUM CURVED 10MM

## (undated) DEVICE — DRESSING WND GZ 10X4X8X2IN

## (undated) DEVICE — KIT MAJOR SINGLE BASIN

## (undated) DEVICE — SUT MONOCRYL CT 0 36IN

## (undated) DEVICE — SOL LAC RINGERS 1000ML INJ

## (undated) DEVICE — NDL SAFETY STD LUER 22GX1.5IN

## (undated) DEVICE — SUT VICRYL 3-0 CTX 36IN

## (undated) DEVICE — GLOVE PROTEXIS PI SYN SURG 6.5

## (undated) DEVICE — COVER FLEXI-FEEL PROBE 6X96IN

## (undated) DEVICE — GAUZE SPONGE 4X4 12PLY

## (undated) DEVICE — SUT PLAIN GUT 3-0 CTX 27

## (undated) DEVICE — SOL NACL IRR 1000ML BTL

## (undated) DEVICE — VACURETTE 9MM CURVED

## (undated) DEVICE — SUT PDS II 96IN XLH TAPER

## (undated) DEVICE — DRAPE CESAREAN 98X123.5X77 STR

## (undated) DEVICE — TRAY DRY SKIN SCRUB PREP